# Patient Record
Sex: FEMALE | Race: WHITE | Employment: OTHER | URBAN - METROPOLITAN AREA
[De-identification: names, ages, dates, MRNs, and addresses within clinical notes are randomized per-mention and may not be internally consistent; named-entity substitution may affect disease eponyms.]

---

## 2017-02-22 ENCOUNTER — HOSPITAL ENCOUNTER (OUTPATIENT)
Dept: SURGERY | Age: 62
Discharge: HOME | End: 2017-02-22
Attending: SPECIALIST | Admitting: SPECIALIST

## 2017-02-22 VITALS
HEIGHT: 62 IN | RESPIRATION RATE: 16 BRPM | WEIGHT: 130 LBS | HEART RATE: 73 BPM | DIASTOLIC BLOOD PRESSURE: 83 MMHG | OXYGEN SATURATION: 98 % | SYSTOLIC BLOOD PRESSURE: 118 MMHG | TEMPERATURE: 97.6 F | BODY MASS INDEX: 23.92 KG/M2

## 2017-02-22 RX ORDER — SODIUM CHLORIDE, SODIUM LACTATE, POTASSIUM CHLORIDE, CALCIUM CHLORIDE 600; 310; 30; 20 MG/100ML; MG/100ML; MG/100ML; MG/100ML
INJECTION, SOLUTION INTRAVENOUS CONTINUOUS
Status: DISCONTINUED | OUTPATIENT
Start: 2017-02-22 | End: 2017-02-23 | Stop reason: HOSPADM

## 2017-02-22 RX ADMIN — SODIUM CHLORIDE, SODIUM LACTATE, POTASSIUM CHLORIDE, CALCIUM CHLORIDE: 600; 310; 30; 20 INJECTION, SOLUTION INTRAVENOUS at 10:23

## 2017-02-22 ASSESSMENT — PAIN - FUNCTIONAL ASSESSMENT: PAIN_FUNCTIONAL_ASSESSMENT: 0-10

## 2017-02-22 ASSESSMENT — PAIN SCALES - GENERAL
PAINLEVEL_OUTOF10: 0
PAINLEVEL_OUTOF10: 0

## 2017-02-22 ASSESSMENT — PAIN DESCRIPTION - LOCATION: LOCATION: ABDOMEN

## 2019-11-06 ENCOUNTER — HOSPITAL ENCOUNTER (OUTPATIENT)
Age: 64
Discharge: HOME | End: 2019-11-06
Payer: MEDICARE

## 2019-11-06 ENCOUNTER — OFFICE VISIT (OUTPATIENT)
Dept: FAMILY MEDICINE CLINIC | Age: 64
End: 2019-11-06
Payer: MEDICARE

## 2019-11-06 ENCOUNTER — HOSPITAL ENCOUNTER (OUTPATIENT)
Dept: GENERAL RADIOLOGY | Age: 64
Discharge: HOME | End: 2019-11-06
Payer: MEDICARE

## 2019-11-06 VITALS
SYSTOLIC BLOOD PRESSURE: 138 MMHG | BODY MASS INDEX: 23.04 KG/M2 | TEMPERATURE: 97.8 F | HEART RATE: 78 BPM | DIASTOLIC BLOOD PRESSURE: 88 MMHG | OXYGEN SATURATION: 98 % | HEIGHT: 63 IN | WEIGHT: 130 LBS

## 2019-11-06 DIAGNOSIS — S60.511A ABRASION OF RIGHT HAND, INITIAL ENCOUNTER: ICD-10-CM
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
## 2019-11-06 DIAGNOSIS — T14.8XXA BRUISE: ICD-10-CM

## 2019-11-06 DIAGNOSIS — M79.641 RIGHT HAND PAIN: ICD-10-CM

## 2019-11-06 PROCEDURE — 99202 OFFICE O/P NEW SF 15 MIN: CPT | Performed by: NURSE PRACTITIONER

## 2019-11-06 PROCEDURE — G8484 FLU IMMUNIZE NO ADMIN: HCPCS | Performed by: NURSE PRACTITIONER

## 2019-11-06 PROCEDURE — 1090F PRES/ABSN URINE INCON ASSESS: CPT | Performed by: NURSE PRACTITIONER

## 2019-11-06 PROCEDURE — 1036F TOBACCO NON-USER: CPT | Performed by: NURSE PRACTITIONER

## 2019-11-06 PROCEDURE — G8420 CALC BMI NORM PARAMETERS: HCPCS | Performed by: NURSE PRACTITIONER

## 2019-11-06 PROCEDURE — G8427 DOCREV CUR MEDS BY ELIG CLIN: HCPCS | Performed by: NURSE PRACTITIONER

## 2019-11-06 PROCEDURE — 4040F PNEUMOC VAC/ADMIN/RCVD: CPT | Performed by: NURSE PRACTITIONER

## 2019-11-06 PROCEDURE — 73130 X-RAY EXAM OF HAND: CPT

## 2019-11-06 PROCEDURE — 1123F ACP DISCUSS/DSCN MKR DOCD: CPT | Performed by: NURSE PRACTITIONER

## 2019-11-06 PROCEDURE — G8400 PT W/DXA NO RESULTS DOC: HCPCS | Performed by: NURSE PRACTITIONER

## 2019-11-06 PROCEDURE — 3017F COLORECTAL CA SCREEN DOC REV: CPT | Performed by: NURSE PRACTITIONER

## 2019-11-06 RX ORDER — IBUPROFEN 200 MG
400 TABLET ORAL ONCE
Status: COMPLETED | OUTPATIENT
Start: 2019-11-06 | End: 2019-11-06

## 2019-11-06 RX ADMIN — Medication 400 MG: at 18:47

## 2019-11-06 ASSESSMENT — ENCOUNTER SYMPTOMS
ABDOMINAL PAIN: 0
BOWEL INCONTINENCE: 0
VISUAL CHANGE: 0
VOMITING: 0
DIARRHEA: 0
NAUSEA: 0
RESPIRATORY NEGATIVE: 1

## 2019-11-06 ASSESSMENT — PAIN SCALES - GENERAL: PAINLEVEL_OUTOF10: 8

## 2019-11-07 DIAGNOSIS — S62.346A CLOSED NONDISPLACED FRACTURE OF BASE OF FIFTH METACARPAL BONE OF RIGHT HAND, INITIAL ENCOUNTER: Primary | ICD-10-CM

## 2020-05-06 ENCOUNTER — HOSPITAL ENCOUNTER (OUTPATIENT)
Age: 65
Discharge: HOME | End: 2020-05-06
Payer: MEDICARE

## 2020-05-06 ENCOUNTER — HOSPITAL ENCOUNTER (OUTPATIENT)
Dept: GENERAL RADIOLOGY | Age: 65
Discharge: HOME | End: 2020-05-06
Payer: MEDICARE

## 2020-05-06 PROCEDURE — 71046 X-RAY EXAM CHEST 2 VIEWS: CPT

## 2021-02-08 ENCOUNTER — HOSPITAL ENCOUNTER (OUTPATIENT)
Age: 66
Setting detail: SPECIMEN
Discharge: HOME | End: 2021-02-08
Payer: MEDICARE

## 2021-02-08 PROCEDURE — 87070 CULTURE OTHR SPECIMN AEROBIC: CPT

## 2021-02-12 LAB
CULTURE: NORMAL
Lab: NORMAL
SPECIMEN: NORMAL

## 2022-05-20 ENCOUNTER — HOSPITAL ENCOUNTER (OUTPATIENT)
Age: 67
Setting detail: OBSERVATION
Discharge: HOME | End: 2022-05-21
Attending: STUDENT IN AN ORGANIZED HEALTH CARE EDUCATION/TRAINING PROGRAM | Admitting: FAMILY MEDICINE
Payer: MEDICARE

## 2022-05-20 ENCOUNTER — APPOINTMENT (OUTPATIENT)
Dept: GENERAL RADIOLOGY | Age: 67
End: 2022-05-20
Payer: MEDICARE

## 2022-05-20 DIAGNOSIS — R07.9 CHEST PAIN, UNSPECIFIED TYPE: Primary | ICD-10-CM

## 2022-05-20 LAB
ALBUMIN SERPL-MCNC: 4.4 GM/DL (ref 3.4–5)
ALP BLD-CCNC: 75 IU/L (ref 40–129)
ALT SERPL-CCNC: 14 U/L (ref 10–40)
ANION GAP SERPL CALCULATED.3IONS-SCNC: 11 MMOL/L (ref 4–16)
APTT: 34.2 SECONDS (ref 25.1–37.1)
AST SERPL-CCNC: 19 IU/L (ref 15–37)
BASOPHILS ABSOLUTE: 0.1 K/CU MM
BASOPHILS RELATIVE PERCENT: 0.5 % (ref 0–1)
BILIRUB SERPL-MCNC: 0.9 MG/DL (ref 0–1)
BUN BLDV-MCNC: 10 MG/DL (ref 6–23)
CALCIUM SERPL-MCNC: 10.5 MG/DL (ref 8.3–10.6)
CHLORIDE BLD-SCNC: 100 MMOL/L (ref 99–110)
CO2: 23 MMOL/L (ref 21–32)
CREAT SERPL-MCNC: 0.9 MG/DL (ref 0.6–1.1)
DIFFERENTIAL TYPE: ABNORMAL
EKG ATRIAL RATE: 94 BPM
EKG DIAGNOSIS: NORMAL
EKG P AXIS: 38 DEGREES
EKG P-R INTERVAL: 140 MS
EKG Q-T INTERVAL: 366 MS
EKG QRS DURATION: 92 MS
EKG QTC CALCULATION (BAZETT): 457 MS
EKG R AXIS: 25 DEGREES
EKG T AXIS: 21 DEGREES
EKG VENTRICULAR RATE: 94 BPM
EOSINOPHILS ABSOLUTE: 0 K/CU MM
EOSINOPHILS RELATIVE PERCENT: 0.2 % (ref 0–3)
ERYTHROCYTE SEDIMENTATION RATE: 34 MM/HR (ref 0–30)
GFR AFRICAN AMERICAN: >60 ML/MIN/1.73M2
GFR NON-AFRICAN AMERICAN: >60 ML/MIN/1.73M2
GLUCOSE BLD-MCNC: 131 MG/DL (ref 70–99)
HCT VFR BLD CALC: 44 % (ref 37–47)
HEMOGLOBIN: 14.3 GM/DL (ref 12.5–16)
HIGH SENSITIVE C-REACTIVE PROTEIN: 236.1 MG/L
IMMATURE NEUTROPHIL %: 0.4 % (ref 0–0.43)
INR BLD: 1.15 INDEX
LV EF: 55 %
LVEF MODALITY: NORMAL
LYMPHOCYTES ABSOLUTE: 0.9 K/CU MM
LYMPHOCYTES RELATIVE PERCENT: 9.2 % (ref 24–44)
MCH RBC QN AUTO: 29.5 PG (ref 27–31)
MCHC RBC AUTO-ENTMCNC: 32.5 % (ref 32–36)
MCV RBC AUTO: 90.7 FL (ref 78–100)
MONOCYTES ABSOLUTE: 0.9 K/CU MM
MONOCYTES RELATIVE PERCENT: 9.1 % (ref 0–4)
NUCLEATED RBC %: 0 %
PDW BLD-RTO: 13.2 % (ref 11.7–14.9)
PLATELET # BLD: 219 K/CU MM (ref 140–440)
PMV BLD AUTO: 10 FL (ref 7.5–11.1)
POTASSIUM SERPL-SCNC: 3.8 MMOL/L (ref 3.5–5.1)
PROTHROMBIN TIME: 14.8 SECONDS (ref 11.7–14.5)
RBC # BLD: 4.85 M/CU MM (ref 4.2–5.4)
SEGMENTED NEUTROPHILS ABSOLUTE COUNT: 8.2 K/CU MM
SEGMENTED NEUTROPHILS RELATIVE PERCENT: 80.6 % (ref 36–66)
SODIUM BLD-SCNC: 134 MMOL/L (ref 135–145)
TOTAL IMMATURE NEUTOROPHIL: 0.04 K/CU MM
TOTAL NUCLEATED RBC: 0 K/CU MM
TOTAL PROTEIN: 8 GM/DL (ref 6.4–8.2)
TROPONIN T: <0.01 NG/ML
TSH HIGH SENSITIVITY: 4.25 UIU/ML (ref 0.27–4.2)
WBC # BLD: 10.2 K/CU MM (ref 4–10.5)

## 2022-05-20 PROCEDURE — 84443 ASSAY THYROID STIM HORMONE: CPT

## 2022-05-20 PROCEDURE — 85730 THROMBOPLASTIN TIME PARTIAL: CPT

## 2022-05-20 PROCEDURE — 93005 ELECTROCARDIOGRAM TRACING: CPT | Performed by: STUDENT IN AN ORGANIZED HEALTH CARE EDUCATION/TRAINING PROGRAM

## 2022-05-20 PROCEDURE — 2580000003 HC RX 258: Performed by: INTERNAL MEDICINE

## 2022-05-20 PROCEDURE — 6370000000 HC RX 637 (ALT 250 FOR IP): Performed by: NURSE PRACTITIONER

## 2022-05-20 PROCEDURE — 86141 C-REACTIVE PROTEIN HS: CPT

## 2022-05-20 PROCEDURE — 96372 THER/PROPH/DIAG INJ SC/IM: CPT

## 2022-05-20 PROCEDURE — 80053 COMPREHEN METABOLIC PANEL: CPT

## 2022-05-20 PROCEDURE — C1894 INTRO/SHEATH, NON-LASER: HCPCS

## 2022-05-20 PROCEDURE — 85025 COMPLETE CBC W/AUTO DIFF WBC: CPT

## 2022-05-20 PROCEDURE — 6370000000 HC RX 637 (ALT 250 FOR IP): Performed by: STUDENT IN AN ORGANIZED HEALTH CARE EDUCATION/TRAINING PROGRAM

## 2022-05-20 PROCEDURE — 99285 EMERGENCY DEPT VISIT HI MDM: CPT

## 2022-05-20 PROCEDURE — 2709999900 HC NON-CHARGEABLE SUPPLY

## 2022-05-20 PROCEDURE — 93010 ELECTROCARDIOGRAM REPORT: CPT | Performed by: INTERNAL MEDICINE

## 2022-05-20 PROCEDURE — 6360000002 HC RX W HCPCS: Performed by: NURSE PRACTITIONER

## 2022-05-20 PROCEDURE — 2580000003 HC RX 258: Performed by: NURSE PRACTITIONER

## 2022-05-20 PROCEDURE — G0378 HOSPITAL OBSERVATION PER HR: HCPCS

## 2022-05-20 PROCEDURE — 71045 X-RAY EXAM CHEST 1 VIEW: CPT

## 2022-05-20 PROCEDURE — 6370000000 HC RX 637 (ALT 250 FOR IP): Performed by: INTERNAL MEDICINE

## 2022-05-20 PROCEDURE — 93306 TTE W/DOPPLER COMPLETE: CPT

## 2022-05-20 PROCEDURE — 85652 RBC SED RATE AUTOMATED: CPT

## 2022-05-20 PROCEDURE — 6360000002 HC RX W HCPCS

## 2022-05-20 PROCEDURE — C1769 GUIDE WIRE: HCPCS

## 2022-05-20 PROCEDURE — 36415 COLL VENOUS BLD VENIPUNCTURE: CPT

## 2022-05-20 PROCEDURE — 6360000004 HC RX CONTRAST MEDICATION

## 2022-05-20 PROCEDURE — 93458 L HRT ARTERY/VENTRICLE ANGIO: CPT

## 2022-05-20 PROCEDURE — 99205 OFFICE O/P NEW HI 60 MIN: CPT | Performed by: INTERNAL MEDICINE

## 2022-05-20 PROCEDURE — C1887 CATHETER, GUIDING: HCPCS

## 2022-05-20 PROCEDURE — 2500000003 HC RX 250 WO HCPCS

## 2022-05-20 PROCEDURE — 94761 N-INVAS EAR/PLS OXIMETRY MLT: CPT

## 2022-05-20 PROCEDURE — 93005 ELECTROCARDIOGRAM TRACING: CPT | Performed by: PHYSICIAN ASSISTANT

## 2022-05-20 PROCEDURE — 84484 ASSAY OF TROPONIN QUANT: CPT

## 2022-05-20 PROCEDURE — 85610 PROTHROMBIN TIME: CPT

## 2022-05-20 PROCEDURE — 93458 L HRT ARTERY/VENTRICLE ANGIO: CPT | Performed by: INTERNAL MEDICINE

## 2022-05-20 RX ORDER — COLCHICINE 0.6 MG/1
0.6 TABLET ORAL 2 TIMES DAILY
Status: DISCONTINUED | OUTPATIENT
Start: 2022-05-20 | End: 2022-05-21 | Stop reason: HOSPADM

## 2022-05-20 RX ORDER — SODIUM CHLORIDE 0.9 % (FLUSH) 0.9 %
5-40 SYRINGE (ML) INJECTION PRN
Status: DISCONTINUED | OUTPATIENT
Start: 2022-05-20 | End: 2022-05-21 | Stop reason: HOSPADM

## 2022-05-20 RX ORDER — SODIUM CHLORIDE 0.9 % (FLUSH) 0.9 %
5-40 SYRINGE (ML) INJECTION EVERY 12 HOURS SCHEDULED
Status: DISCONTINUED | OUTPATIENT
Start: 2022-05-20 | End: 2022-05-21 | Stop reason: HOSPADM

## 2022-05-20 RX ORDER — FAMOTIDINE 20 MG/1
20 TABLET, FILM COATED ORAL DAILY
Status: DISCONTINUED | OUTPATIENT
Start: 2022-05-20 | End: 2022-05-21 | Stop reason: HOSPADM

## 2022-05-20 RX ORDER — SODIUM CHLORIDE 9 MG/ML
INJECTION, SOLUTION INTRAVENOUS PRN
Status: DISCONTINUED | OUTPATIENT
Start: 2022-05-20 | End: 2022-05-21 | Stop reason: HOSPADM

## 2022-05-20 RX ORDER — ONDANSETRON 2 MG/ML
4 INJECTION INTRAMUSCULAR; INTRAVENOUS EVERY 6 HOURS PRN
Status: DISCONTINUED | OUTPATIENT
Start: 2022-05-20 | End: 2022-05-21 | Stop reason: HOSPADM

## 2022-05-20 RX ORDER — MORPHINE SULFATE 2 MG/ML
2 INJECTION, SOLUTION INTRAMUSCULAR; INTRAVENOUS EVERY 4 HOURS PRN
Status: DISCONTINUED | OUTPATIENT
Start: 2022-05-20 | End: 2022-05-21 | Stop reason: HOSPADM

## 2022-05-20 RX ORDER — ASPIRIN 81 MG/1
324 TABLET, CHEWABLE ORAL ONCE
Status: COMPLETED | OUTPATIENT
Start: 2022-05-20 | End: 2022-05-20

## 2022-05-20 RX ORDER — ACETAMINOPHEN 325 MG/1
650 TABLET ORAL EVERY 6 HOURS PRN
Status: DISCONTINUED | OUTPATIENT
Start: 2022-05-20 | End: 2022-05-21 | Stop reason: HOSPADM

## 2022-05-20 RX ORDER — 0.9 % SODIUM CHLORIDE 0.9 %
500 INTRAVENOUS SOLUTION INTRAVENOUS ONCE
Status: DISCONTINUED | OUTPATIENT
Start: 2022-05-20 | End: 2022-05-21 | Stop reason: HOSPADM

## 2022-05-20 RX ORDER — ASPIRIN 81 MG/1
81 TABLET ORAL
Status: DISCONTINUED | OUTPATIENT
Start: 2022-05-21 | End: 2022-05-21 | Stop reason: HOSPADM

## 2022-05-20 RX ORDER — ATORVASTATIN CALCIUM 40 MG/1
40 TABLET, FILM COATED ORAL NIGHTLY
Status: DISCONTINUED | OUTPATIENT
Start: 2022-05-20 | End: 2022-05-21 | Stop reason: HOSPADM

## 2022-05-20 RX ORDER — ENOXAPARIN SODIUM 100 MG/ML
40 INJECTION SUBCUTANEOUS EVERY 24 HOURS
Status: DISCONTINUED | OUTPATIENT
Start: 2022-05-20 | End: 2022-05-21 | Stop reason: HOSPADM

## 2022-05-20 RX ORDER — ACETAMINOPHEN 325 MG/1
650 TABLET ORAL EVERY 4 HOURS PRN
Status: DISCONTINUED | OUTPATIENT
Start: 2022-05-20 | End: 2022-05-20

## 2022-05-20 RX ORDER — ACETAMINOPHEN 650 MG/1
650 SUPPOSITORY RECTAL EVERY 6 HOURS PRN
Status: DISCONTINUED | OUTPATIENT
Start: 2022-05-20 | End: 2022-05-21 | Stop reason: HOSPADM

## 2022-05-20 RX ORDER — POLYETHYLENE GLYCOL 3350 17 G/17G
17 POWDER, FOR SOLUTION ORAL DAILY PRN
Status: DISCONTINUED | OUTPATIENT
Start: 2022-05-20 | End: 2022-05-21 | Stop reason: HOSPADM

## 2022-05-20 RX ORDER — KETOROLAC TROMETHAMINE 30 MG/ML
15 INJECTION, SOLUTION INTRAMUSCULAR; INTRAVENOUS EVERY 6 HOURS PRN
Status: DISCONTINUED | OUTPATIENT
Start: 2022-05-20 | End: 2022-05-20

## 2022-05-20 RX ORDER — ONDANSETRON 4 MG/1
4 TABLET, ORALLY DISINTEGRATING ORAL EVERY 8 HOURS PRN
Status: DISCONTINUED | OUTPATIENT
Start: 2022-05-20 | End: 2022-05-21 | Stop reason: HOSPADM

## 2022-05-20 RX ORDER — HYDRALAZINE HYDROCHLORIDE 20 MG/ML
10 INJECTION INTRAMUSCULAR; INTRAVENOUS EVERY 10 MIN PRN
Status: DISCONTINUED | OUTPATIENT
Start: 2022-05-20 | End: 2022-05-21 | Stop reason: HOSPADM

## 2022-05-20 RX ORDER — LANOLIN ALCOHOL/MO/W.PET/CERES
3 CREAM (GRAM) TOPICAL NIGHTLY PRN
Status: DISCONTINUED | OUTPATIENT
Start: 2022-05-20 | End: 2022-05-21 | Stop reason: HOSPADM

## 2022-05-20 RX ADMIN — Medication 1 TABLET: at 13:01

## 2022-05-20 RX ADMIN — MELATONIN 3 MG: at 23:45

## 2022-05-20 RX ADMIN — ENOXAPARIN SODIUM 40 MG: 100 INJECTION SUBCUTANEOUS at 13:01

## 2022-05-20 RX ADMIN — ASPIRIN 324 MG: 81 TABLET, CHEWABLE ORAL at 10:56

## 2022-05-20 RX ADMIN — SODIUM CHLORIDE, PRESERVATIVE FREE 10 ML: 5 INJECTION INTRAVENOUS at 21:38

## 2022-05-20 RX ADMIN — COLCHICINE 0.6 MG: 0.6 TABLET ORAL at 21:38

## 2022-05-20 RX ADMIN — FAMOTIDINE 20 MG: 20 TABLET ORAL at 13:01

## 2022-05-20 ASSESSMENT — PAIN SCALES - GENERAL
PAINLEVEL_OUTOF10: 3
PAINLEVEL_OUTOF10: 6
PAINLEVEL_OUTOF10: 6
PAINLEVEL_OUTOF10: 0

## 2022-05-20 ASSESSMENT — PAIN DESCRIPTION - ONSET: ONSET: GRADUAL

## 2022-05-20 ASSESSMENT — PAIN DESCRIPTION - LOCATION
LOCATION: BACK;CHEST;OTHER (COMMENT)
LOCATION: CHEST
LOCATION: CHEST;BACK

## 2022-05-20 ASSESSMENT — PAIN DESCRIPTION - DESCRIPTORS
DESCRIPTORS: ACHING
DESCRIPTORS: SHARP

## 2022-05-20 ASSESSMENT — PAIN - FUNCTIONAL ASSESSMENT: PAIN_FUNCTIONAL_ASSESSMENT: 0-10

## 2022-05-20 ASSESSMENT — PAIN DESCRIPTION - ORIENTATION: ORIENTATION: UPPER

## 2022-05-20 ASSESSMENT — PAIN DESCRIPTION - PAIN TYPE
TYPE: ACUTE PAIN
TYPE: ACUTE PAIN

## 2022-05-20 ASSESSMENT — PAIN DESCRIPTION - FREQUENCY: FREQUENCY: INTERMITTENT

## 2022-05-20 NOTE — H&P
History and Physical      Name:  Minh Xiong /Age/Sex: 1955  (69 y.o. female)   MRN & CSN:  [id number] & [id number] Admission Date/Time: 2022 10:36 AM   Location:  [facility]/3126 PCP: Ada Torres MD       Hospital Day: 1           Assessment and Plan:   # Chest pain - concern for STEMI s/p LHC showing no occlusive disease, reportedly findings consistent with possible pericarditis; report pending. Management per Cardiology. # Possible pericarditis - ESR elevated, CRP pending. NSAIDS, morphine prn for breakthrough, f/u TTE  # Hyponatremia - Mild, sodium 134, oral hydration  # Hyperglycemia - likely stress induced, A1C in am    Present on Admission:   Chest pain             Diet ADULT DIET; Regular; Low Fat/Low Chol/High Fiber/2 gm Na; No Caffeine   DVT Prophylaxis [x] Lovenox, []  Heparin, [] SCDs, [] Ambulation  [] Long term AC   GI Prophylaxis [] PPI,  [x] H2 Blocker,  [] Carafate,  [] Diet/Tube Feeds   Code Status Full code   Disposition Admit to observation. Patient plans to return home upon discharge         Chief Complaint: Chest Pain      History obtained from patient, EHR   History of Present Illness:   Minh Xiong is a 69 y.o. female  with no significant medical history who presents with chest pain. The patient reports waking up this morning with bilateral mid chest wall pain which worsened with taking a deep breath radiating to her back. She denies associated shortness of breath nausea vomiting or diaphoresis. She describes chest pain as constant pressure. She denies any prior history. She does note she had chills yesterday but did not measure her temperature. She denies new medications or injury. She presented to ED for evaluation. Chest x-ray was nonacute. EKG showed diffuse ST elevation with T wave abnormality concerning for acute STEMI. Troponin negative x1. Chemistry showed sodium of 134 glucose 131 otherwise unremarkable. LFTs unremarkable.   CBC shows WBC of      Spouse name: None    Number of children: None    Years of education: None    Highest education level: None   Occupational History    None   Tobacco Use    Smoking status: Never Smoker    Smokeless tobacco: Never Used   Vaping Use    Vaping Use: Never used   Substance and Sexual Activity    Alcohol use: Yes     Alcohol/week: 3.0 standard drinks     Types: 3 Glasses of wine per week     Comment: occasional    Drug use: No    Sexual activity: None   Other Topics Concern    None   Social History Narrative    None     Social Determinants of Health     Financial Resource Strain:     Difficulty of Paying Living Expenses: Not on file   Food Insecurity:     Worried About Running Out of Food in the Last Year: Not on file    Bunny of Food in the Last Year: Not on file   Transportation Needs:     Lack of Transportation (Medical): Not on file    Lack of Transportation (Non-Medical): Not on file   Physical Activity:     Days of Exercise per Week: Not on file    Minutes of Exercise per Session: Not on file   Stress:     Feeling of Stress : Not on file   Social Connections:     Frequency of Communication with Friends and Family: Not on file    Frequency of Social Gatherings with Friends and Family: Not on file    Attends Denominational Services: Not on file    Active Member of [address] [place] [address] or Organizations: Not on file    Attends Club or Organization Meetings: Not on file    Marital Status: Not on file   Intimate Partner Violence:     Fear of Current or Ex-Partner: Not on file    Emotionally Abused: Not on file    Physically Abused: Not on file    Sexually Abused: Not on file   Housing Stability:     Unable to Pay for Housing in the Last Year: Not on file    Number of [place] in the Last Year: Not on file    Unstable Housing in the Last Year: Not on file      reports that she has never smoked.  She has never used smokeless tobacco.   reports current alcohol use of about 3.0 standard drinks of alcohol per week. reports no history of drug use. Social history reviewed  Allergies:   No Known Allergies    Home Medications:     Prior to Admission medications    Medication Sig Start Date End Date Taking?  Authorizing Provider   Calcium Carb-Cholecalciferol (CALCIUM 600 + D PO) Take by mouth    Historical Provider, MD   Multiple Vitamins-Minerals (THERAPEUTIC MULTIVITAMIN-MINERALS) tablet Take 1 tablet by mouth daily    Historical Provider, MD   Cholecalciferol (VITAMIN D3) 2000 UNITS CAPS Take by mouth daily    Historical Provider, MD   Misc Natural Products (OSTEO BI-FLEX ADV TRIPLE ST PO) Take by mouth daily    Historical Provider, Tae Flynn, (Shazia Flank) POWD by Does not apply route    Historical Provider, MD   Omega-3 Fatty Acids (OMEGA 3 500 PO) Take by mouth    Historical Provider, MD   Garlic 791 MG TABS Take by mouth    Historical Provider, MD   Coenzyme Q10 (CO Q-10) 100 MG CAPS Take by mouth daily    Historical Provider, MD   Nutritional Supplements (JUICE PLUS FIBRE PO) Take by mouth daily    Historical Provider, MD   Probiotic Product (PROBIOTIC & ACIDOPHILUS EX ST PO) Take by mouth    Historical Provider, MD   aspirin 81 MG tablet Take 81 mg by mouth four times a week    Historical Provider, MD         Medications:   Medications:    sodium chloride flush  5-40 mL IntraVENous 2 times per day    atorvastatin  40 mg Oral Nightly    famotidine  20 mg Oral Daily    enoxaparin  40 mg SubCUTAneous Q24H    [START ON 5/21/2022] aspirin  81 mg Oral Once per day on Sun Tue Thu Sat    calcium-cholecalciferol  1 tablet Oral Daily      Infusions:    sodium chloride       PRN Meds: sodium chloride flush, 5-40 mL, PRN  sodium chloride, , PRN  ondansetron, 4 mg, Q8H PRN   Or  ondansetron, 4 mg, Q6H PRN  acetaminophen, 650 mg, Q6H PRN   Or  acetaminophen, 650 mg, Q6H PRN  polyethylene glycol, 17 g, Daily PRN        Data:     Laboratory this visit:  Reviewed  Recent Labs     05/20/22  1048 WBC 10.2   HGB 14.3   HCT 44.0         Recent Labs     05/20/22  1045   *   K 3.8      CO2 23   BUN 10   CREATININE 0.9     Recent Labs     05/20/22  1045   AST 19   ALT 14   BILITOT 0.9   ALKPHOS 75     Recent Labs     05/20/22  1045   INR 1.15         Radiology this visit:  Reviewed. XR CHEST PORTABLE    Result Date: 5/20/2022  EXAMINATION: ONE XRAY VIEW OF THE CHEST 5/20/2022 10:40 am COMPARISON: May 6, 2020 HISTORY: ORDERING SYSTEM PROVIDED HISTORY: Chest pain TECHNOLOGIST PROVIDED HISTORY: Reason for exam:->Chest pain Reason for Exam: Chest pain FINDINGS: The cardiomediastinal silhouette is not enlarged. No pleural effusion or pneumothorax. No focal consolidation. Calcified granuloma over the left lower chest.  Streak atelectasis in the left lung base. No acute cardiopulmonary abnormality.            EKG this visit:  personally reviewed         Electronically signed by KIERA Melendez CNP on 5/20/2022 at 12:56 PM

## 2022-05-20 NOTE — CONSULTS
CARDIOLOGY CONSULT NOTE   Reason for consultation:  STEMI    Referring physician:  Kathy Michelle MD     Primary care physician: Nadeem Jackson MD      Dear  Dr. Kathy Michelle MD   Thanks for the consult. Chief Complaints :  Chief Complaint   Patient presents with    Chest Pain        History of present illness:Susan is a 69 y. o.year old who presents with going pain pressure-like sensation which gets worse with deep inspiration and laying down flat also pain going to her back between shoulder blades she has been short of breath has been coughing and some respiratory symptoms hence came into the emergency department where there was concern for possible ST elevation in aVL and STEMI was activated. Past medical history:    has no past medical history on file. Past surgical history:   has a past surgical history that includes Breast enhancement surgery; Colonoscopy (2005); and Colonoscopy (02/22/2017). Social History:   reports that she has never smoked. She has never used smokeless tobacco. She reports current alcohol use of about 3.0 standard drinks of alcohol per week. She reports that she does not use drugs.   Family history:   no family history of CAD, STROKE of DM at early age    No Known Allergies    sodium chloride flush 0.9 % injection 5-40 mL, 2 times per day  sodium chloride flush 0.9 % injection 5-40 mL, PRN  0.9 % sodium chloride infusion, PRN  ondansetron (ZOFRAN-ODT) disintegrating tablet 4 mg, Q8H PRN   Or  ondansetron (ZOFRAN) injection 4 mg, Q6H PRN  acetaminophen (TYLENOL) tablet 650 mg, Q6H PRN   Or  acetaminophen (TYLENOL) suppository 650 mg, Q6H PRN  polyethylene glycol (GLYCOLAX) packet 17 g, Daily PRN  atorvastatin (LIPITOR) tablet 40 mg, Nightly  famotidine (PEPCID) tablet 20 mg, Daily  enoxaparin (LOVENOX) injection 40 mg, Q24H  [START ON 5/21/2022] aspirin EC tablet 81 mg, Once per day on Sun Tue Thu Sat  calcium-cholecalciferol 500-200 MG-UNIT per tablet 1 tablet, Daily  [Held by provider] ketorolac (TORADOL) injection 15 mg, Q6H PRN      Current Facility-Administered Medications   Medication Dose Route Frequency Provider Last Rate Last Admin    sodium chloride flush 0.9 % injection 5-40 mL  5-40 mL IntraVENous 2 times per day KIERA Triplett CNP        sodium chloride flush 0.9 % injection 5-40 mL  5-40 mL IntraVENous PRN KIERA Triplett CNP        0.9 % sodium chloride infusion   IntraVENous PRN KIERA Triplett CNP        ondansetron (ZOFRAN-ODT) disintegrating tablet 4 mg  4 mg Oral Q8H PRN KIERA Triplett CNP        Or    ondansetron (ZOFRAN) injection 4 mg  4 mg IntraVENous Q6H PRN KIERA Triplett CNP        acetaminophen (TYLENOL) tablet 650 mg  650 mg Oral Q6H PRN KIERA Triplett CNP        Or    acetaminophen (TYLENOL) suppository 650 mg  650 mg Rectal Q6H PRN KIERA Triplett CNP        polyethylene glycol (GLYCOLAX) packet 17 g  17 g Oral Daily PRN KIERA Triplett CNP        atorvastatin (LIPITOR) tablet 40 mg  40 mg Oral Nightly KIERA Triplett CNP        famotidine (PEPCID) tablet 20 mg  20 mg Oral Daily KIERA Triplett CNP   20 mg at 05/20/22 1301    enoxaparin (LOVENOX) injection 40 mg  40 mg SubCUTAneous Q24H KIERA Triplett CNP   40 mg at 05/20/22 1301    [START ON 5/21/2022] aspirin EC tablet 81 mg  81 mg Oral Once per day on Sun Tue Thu Sat KIERA Triplett CNP        calcium-cholecalciferol 500-200 MG-UNIT per tablet 1 tablet  1 tablet Oral Daily KIERA Triplett CNP   1 tablet at 05/20/22 1301    [Held by provider] ketorolac (TORADOL) injection 15 mg  15 mg IntraVENous Q6H PRN KIERA Yao CNP         Review of Systems:   · Constitutional: No Fever or Weight Loss   · Eyes: No Decreased Vision  · ENT: No Headaches, Hearing Loss or Vertigo  · Cardiovascular: As per HPI  · Respiratory: As per HPI  · Gastrointestinal: No abdominal pain, appetite loss, blood in stools, constipation, diarrhea or heartburn  · Genitourinary: No dysuria, trouble voiding, or hematuria  · Musculoskeletal:  No gait disturbance, weakness or joint complaints  · Integumentary: No rash or pruritis  · Neurological: No TIA or stroke symptoms  · Psychiatric: No anxiety or depression  · Endocrine: No malaise, fatigue or temperature intolerance  · Hematologic/Lymphatic: No bleeding problems, blood clots or swollen lymph nodes  · Allergic/Immunologic: No nasal congestion or hives  All systems negative except as marked. Physical Examination:    Vitals:    05/20/22 1300 05/20/22 1330 05/20/22 1345 05/20/22 1400   BP: 110/75 110/79 106/75 110/73   Pulse: 85 83 83 83   Resp: 22 25 22 15   Temp:       TempSrc:       SpO2: 94% 95% 95% 94%   Weight:       Height:           General Appearance:  No distress, conversant    Constitutional:  Well developed, Well nourished, No acute distress, Non-toxic appearance. HENT:  Normocephalic, Atraumatic, Bilateral external ears normal, Oropharynx moist, No oral exudates, Nose normal. Neck- Normal range of motion, No tenderness, Supple, No stridor,no apical-carotid delay  Lymphatics : no palpable lymph nodes  Eyes:  PERRL, EOMI, Conjunctiva normal, No discharge. Respiratory:  Normal breath sounds, No respiratory distress, No wheezing, No chest tenderness. ,no use of accessory muscles, crackles Absent   Cardiovascular: (PMI) apex non displaced,no lifts no thrills, ankle swelling Absent  , 1+, s1 and s2 audible,Murmur. Absent , JVD not noted    Abdomen /GI:  Bowel sounds normal, Soft, No tenderness, No masses, No gross visceromegaly   :  No costovertebral angle tenderness   Musculoskeletal:  No edema, no tenderness, no deformities.  Back- no tenderness  Integument:  Well hydrated, no rash   Lymphatic:  No lymphadenopathy noted   Neurologic:  Alert & oriented x 3, CN 2-12 normal, normal motor function, normal sensory function, no focal deficits noted           Medical decision making and Data review:    Lab Review   Recent Labs     05/20/22  1045   WBC 10.2   HGB 14.3   HCT 44.0         Recent Labs     05/20/22  1045   *   K 3.8      CO2 23   BUN 10   CREATININE 0.9     Recent Labs     05/20/22  1045   AST 19   ALT 14   BILITOT 0.9   ALKPHOS 75     Recent Labs     05/20/22  1045   TROPONINT <0.010       No results for input(s): PROBNP in the last 72 hours. Lab Results   Component Value Date    INR 1.15 05/20/2022    PROTIME 14.8 (H) 05/20/2022       EKG: (reviewed by myself)    ECHO:(reviewed by myself)    Chest Xray:(reviewed by myself)  XR CHEST PORTABLE    Result Date: 5/20/2022  EXAMINATION: ONE XRAY VIEW OF THE CHEST 5/20/2022 10:40 am COMPARISON: May 6, 2020 HISTORY: ORDERING SYSTEM PROVIDED HISTORY: Chest pain TECHNOLOGIST PROVIDED HISTORY: Reason for exam:->Chest pain Reason for Exam: Chest pain FINDINGS: The cardiomediastinal silhouette is not enlarged. No pleural effusion or pneumothorax. No focal consolidation. Calcified granuloma over the left lower chest.  Streak atelectasis in the left lung base. No acute cardiopulmonary abnormality. All labs, medications and tests reviewed by myself including data  from outside source , patient and available family . Continue all other medications of all above medical condition listed as is. Impression:  Principal Problem:    Chest pain  Resolved Problems:    * No resolved hospital problems. *      Assessment: 69 y. o.year old with PMH of  has no past medical history on file. Plan and Recommendations:    Suspected pericarditis or pleurisy with ongoing cough and symptoms getting worse on deep inspiration EKG shows ST elevation only in aVL but given her symptoms and ongoing pain we will proceed with cardiac catheterization to define coronary anatomy / STEMI activation  Check ESR CRP  Get echo  DVT prophylaxis if no contraindication  6.    Dyslipidemia: Lipid panel          Thank you  much for consult and giving us the opportunity in contributing in the care of this patient. Please feel free to call me for any questions.        Denis Fuentes MD, 5/20/2022 3:35 PM

## 2022-05-20 NOTE — ED TRIAGE NOTES
Pt states chest pain yesterday continuing to today, radiates to shoulder
POST-OP DIAGNOSIS:  Bladder stone 05-May-2021 17:21:22  Mohit Moya R

## 2022-05-20 NOTE — ED PROVIDER NOTES
Emergency Department Encounter    Patient: John Chung  MRN: [id number]  : 1955  Date of Evaluation: 2022  ED Provider:  Humberto Chang MD    Triage Chief Complaint:   Chest Pain    [place]:  John Chung is a 69 y.o. female senting with chest discomfort since yesterday. Patient states she woke up yesterday with diffuse chest pressure. States has been constant with worsening with deep breaths. States the pain is 6 out of 10, constant with intermittent worsening. States she has trouble describing the pain. Denies radiation of pain. States she did have some chills yesterday. Denies fevers. Denies recent falls or trauma. Denies headache, blurred vision, focal neurodeficits, motor or sensory changes, lightheadedness or dizziness, central pain along the spine. Denies abdominal pain, nausea vomiting, diarrhea constipation, urinary symptoms. Denies previous cardiopulmonary history. Denies history of MI or stroke in the past.  Denies history of hypertension, hyperlipidemia, diabetes. States she does not smoke. Denies significant alcohol use or any drug use. ROS - see HPI, below listed is current ROS at time of my eval:  At least 14 systems reviewed, negative other HPI    History reviewed. No pertinent past medical history. Past Surgical History:   Procedure Laterality Date    BREAST ENHANCEMENT SURGERY      -original implants;     -rupture then replaced 1996-ruptured    COLONOSCOPY      COLONOSCOPY  2017    mild diverticulosis coli, internal grade 1 hemorrhoids     History reviewed. No pertinent family history.   Social History     Socioeconomic History    Marital status:      Spouse name: Not on file    Number of children: Not on file    Years of education: Not on file    Highest education level: Not on file   Occupational History    Not on file   Tobacco Use    Smoking status: Never Smoker    Smokeless tobacco: Never Used   Vaping Use    Vaping Use: Never used   Substance and Sexual Activity    Alcohol use: Yes     Alcohol/week: 3.0 standard drinks     Types: 3 Glasses of wine per week     Comment: occasional    Drug use: No    Sexual activity: Not on file   Other Topics Concern    Not on file   Social History Narrative    Not on file     Social Determinants of Health     Financial Resource Strain:     Difficulty of Paying Living Expenses: Not on file   Food Insecurity:     Worried About Running Out of Food in the Last Year: Not on file    Bunny of Food in the Last Year: Not on file   Transportation Needs:     Lack of Transportation (Medical): Not on file    Lack of Transportation (Non-Medical):  Not on file   Physical Activity:     Days of Exercise per Week: Not on file    Minutes of Exercise per Session: Not on file   Stress:     Feeling of Stress : Not on file   Social Connections:     Frequency of Communication with Friends and Family: Not on file    Frequency of Social Gatherings with Friends and Family: Not on file    Attends [employer] Services: Not on file    Active Member of [address] [place] [address] or Organizations: Not on file    Attends Club or Organization Meetings: Not on file    Marital Status: Not on file   Intimate Partner Violence:     Fear of Current or Ex-Partner: Not on file    Emotionally Abused: Not on file    Physically Abused: Not on file    Sexually Abused: Not on file   Housing Stability:     Unable to Pay for Housing in the Last Year: Not on file    Number of [place] in the Last Year: Not on file    Unstable Housing in the Last Year: Not on file     Current Facility-Administered Medications   Medication Dose Route Frequency Provider Last Rate Last Admin    sodium chloride flush 0.9 % injection 5-40 mL  5-40 mL IntraVENous 2 times per day KIERA Fritz CNP        sodium chloride flush 0.9 % injection 5-40 mL  5-40 mL IntraVENous PRN KIERA Fritz CNP        0.9 % sodium chloride infusion   IntraVENous PRN Marlena Severs, APRN - LUIS ANTONIO        ondansetron (ZOFRAN-ODT) disintegrating tablet 4 mg  4 mg Oral Q8H PRN Marlena Severs, APRN - CNP        Or    ondansetron (ZOFRAN) injection 4 mg  4 mg IntraVENous Q6H PRN Marlena Severs, APRN - CNP        acetaminophen (TYLENOL) tablet 650 mg  650 mg Oral Q6H PRN Marlena Severs, APRN - CNP        Or    acetaminophen (TYLENOL) suppository 650 mg  650 mg Rectal Q6H PRN Marlena Severs, APRN - CNP        polyethylene glycol (GLYCOLAX) packet 17 g  17 g Oral Daily PRN Marlena Severs, APRN - CNP        atorvastatin (LIPITOR) tablet 40 mg  40 mg Oral Nightly Marlena Severs, APRN - CNP        famotidine (PEPCID) tablet 20 mg  20 mg Oral Daily Marlena Severs, APRN - CNP   20 mg at 05/20/22 1301    enoxaparin (LOVENOX) injection 40 mg  40 mg SubCUTAneous Q24H Marlena Severs, APRN - CNP   40 mg at 05/20/22 1301    [START ON 5/21/2022] aspirin EC tablet 81 mg  81 mg Oral Once per day on Sun Tue Thu Sat Marlena Severs, APRN - CNP        calcium-cholecalciferol 500-200 MG-UNIT per tablet 1 tablet  1 tablet Oral Daily Marlena Severs, APRN - CNP   1 tablet at 05/20/22 1301    [Held by provider] ketorolac (TORADOL) injection 15 mg  15 mg IntraVENous Q6H PRN Marlena Severs, APRN - CNP        colchicine (COLCRYS) tablet 0.6 mg  0.6 mg Oral BID Elizabeth Hunter MD        0.9 % sodium chloride bolus  500 mL IntraVENous Once Elizabeth Hunter MD        sodium chloride flush 0.9 % injection 5-40 mL  5-40 mL IntraVENous 2 times per day Elizabeth Hunter MD        sodium chloride flush 0.9 % injection 5-40 mL  5-40 mL IntraVENous PRN Elizabeth Hunter MD        0.9 % sodium chloride infusion   IntraVENous PRN Elizbaeth Hunter MD        acetaminophen (TYLENOL) tablet 650 mg  650 mg Oral Q4H PRN Elizabeth Hunter MD        hydrALAZINE (APRESOLINE) injection 10 mg  10 mg IntraVENous Q10 Min PRN Elizabeth Hunter MD         No Known Allergies    Nursing Notes Reviewed    Physical Exam:  Triage VS:    ED Triage Vitals [05/20/22 1030]   Enc Vitals Group      BP (!) 140/83      Pulse 101      Resp 18      Temp 97.8 °F (36.6 °C)      Temp Source Oral      SpO2 98 %      Weight 130 lb (59 kg)      Height 5' 2\" (1.575 m)      Head Circumference       Peak Flow       Pain Score       Pain Loc       Pain Edu? Excl. in [address]? My pulse ox interpretation is  normal    General appearance:  No acute distress. Skin:  Warm. Dry. Eye:  Extraocular movements intact. Ears, nose, mouth and throat:  Oral mucosa moist   Neck:  Trachea midline. Extremity:  No swelling. Normal ROM     Heart:  Regular rate and rhythm, normal S1 & S2, no extra heart sounds. Perfusion:  intact  Respiratory:  Lungs clear to auscultation bilaterally. Respirations nonlabored. Abdominal:  Normal bowel sounds. Soft. Nontender. Non distended. Back:  No CVA tenderness to palpation     Neurological:  Alert and oriented times 3. No focal neuro deficits.              Psychiatric:  Appropriate    I have reviewed and interpreted all of the currently available lab results from this visit (if applicable):  Results for orders placed or performed during the hospital encounter of 05/20/22   CBC with Auto Differential   Result Value Ref Range    WBC 10.2 4.0 - 10.5 K/CU MM    RBC 4.85 4.2 - 5.4 M/CU MM    Hemoglobin 14.3 12.5 - 16.0 GM/DL    Hematocrit 44.0 37 - 47 %    MCV 90.7 78 - 100 FL    MCH 29.5 27 - 31 PG    MCHC 32.5 32.0 - 36.0 %    RDW 13.2 11.7 - 14.9 %    Platelets 224 944 - 838 K/CU MM    MPV 10.0 7.5 - 11.1 FL    Differential Type AUTOMATED DIFFERENTIAL     Segs Relative 80.6 (H) 36 - 66 %    Lymphocytes % 9.2 (L) 24 - 44 %    Monocytes % 9.1 (H) 0 - 4 %    Eosinophils % 0.2 0 - 3 %    Basophils % 0.5 0 - 1 %    Segs Absolute 8.2 K/CU MM    Lymphocytes Absolute 0.9 K/CU MM    Monocytes Absolute 0.9 K/CU MM    Eosinophils Absolute 0.0 K/CU MM    Basophils Absolute 0.1 K/CU MM    Nucleated RBC % 0.0 %    Total Nucleated RBC 0.0 K/CU MM    Total Immature Neutrophil 0.04 K/CU MM    Immature Neutrophil % 0.4 0 - 0.43 %   Comprehensive Metabolic Panel   Result Value Ref Range    Sodium 134 (L) 135 - 145 MMOL/L    Potassium 3.8 3.5 - 5.1 MMOL/L    Chloride 100 99 - 110 mMol/L    CO2 23 21 - 32 MMOL/L    BUN 10 6 - 23 MG/DL    CREATININE 0.9 0.6 - 1.1 MG/DL    Glucose 131 (H) 70 - 99 MG/DL    Calcium 10.5 8.3 - 10.6 MG/DL    Albumin 4.4 3.4 - 5.0 GM/DL    Total Protein 8.0 6.4 - 8.2 GM/DL    Total Bilirubin 0.9 0.0 - 1.0 MG/DL    ALT 14 10 - 40 U/L    AST 19 15 - 37 IU/L    Alkaline Phosphatase 75 40 - 129 IU/L    GFR Non-African American >60 >60 mL/min/1.73m2    GFR African American >60 >60 mL/min/1.73m2    Anion Gap 11 4 - 16   Troponin   Result Value Ref Range    Troponin T <0.010 <0.01 NG/ML   Protime-INR   Result Value Ref Range    Protime 14.8 (H) 11.7 - 14.5 SECONDS    INR 1.15 INDEX   PTT   Result Value Ref Range    aPTT 34.2 25.1 - 37.1 SECONDS   C-Reactive Protein   Result Value Ref Range    CRP, High Sensitivity 236.1 mg/L   Sedimentation Rate   Result Value Ref Range    Sed Rate 34 (H) 0 - 30 MM/HR   TSH   Result Value Ref Range    TSH, High Sensitivity 4.250 (H) 0.270 - 4.20 uIu/ml   EKG 12 Lead   Result Value Ref Range    Ventricular Rate 94 BPM    Atrial Rate 94 BPM    P-R Interval 140 ms    QRS Duration 92 ms    Q-T Interval 366 ms    QTc Calculation (Bazett) 457 ms    P Axis 38 degrees    R Axis 25 degrees    T Axis 21 degrees    Diagnosis       Normal sinus rhythm  Possible Left atrial enlargement  RSR' or QR pattern in V1 suggests right ventricular conduction delay  ST elevation, consider inferolateral injury or acute infarct  ** ** ACUTE MI / STEMI ** **  Abnormal ECG  When compared with ECG of 20-MAY-2022 10:33,  No significant change was found        Radiographs (if obtained):  Radiologist's Report Reviewed:  ECHO Complete 2D W Doppler W Color    Result Date: 5/20/2022  Transthoracic Echocardiography Report (TTE)  Demographics   Patient Name       Adriana Multani     Date of Study       05/20/2022   Date of Birth      1955         Gender              Female   Age                69 year(s)         Race                   Patient Number     [id number]         Room Number         3126   Visit Number       [id number]   Corporate ID       [id number]   Accession Number   [id number]         23 Tori Chung A[place]ANDREA   Ordering Physician Sonu Maciel MD                 Physician           MD  Procedure Type of Study   TTE procedure:ECHOCARDIOGRAM COMPLETE 2D W DOPPLER W COLOR. Procedure Date Date: 05/20/2022 Start: 03:51 PM Study Location: Portable Technical Quality: Adequate visualization Indications:Chest pain. Patient Status: Routine Height: 62 inches Weight: 130 pounds BSA: 1.59 m2 BMI: 23.78 kg/m2 HR: 87 bpm BP: 120/68 mmHg  Conclusions   Summary  Left ventricular systolic function is normal.  Ejection fraction is visually estimated at 55%. No significant valvular disease noted. No evidence of any pericardial effusion. Signature   ------------------------------------------------------------------  Electronically signed by José Antonio Tsai MD (Interpreting  physician) on 05/20/2022 at 05:23 PM  ------------------------------------------------------------------   Findings   Left Ventricle  Left ventricular systolic function is normal.  Ejection fraction is visually estimated at 55%. No regional wall motion abnormalites. Left ventricle size is normal.  Normal diastolic function. Left Atrium  Essentially normal left atrium. Right Atrium  Essentially normal right atrium. Right Ventricle  Essentially normal right ventricle. Aortic Valve  Structurally normal aortic valve.    Mitral Valve Structurally normal mitral valve. Tricuspid Valve  Tricuspid valve is structurally normal.   Pulmonic Valve  The pulmonic valve was not well visualized. Pericardial Effusion  No evidence of any pericardial effusion. Pleural Effusion  No evidence of pleural effusion.   M-Mode/2D Measurements & Calculations   LV Diastolic Dimension:  LV Systolic Dimension:  LA Dimension: 2.6 cmAO Root  3.91 cm                  1.66 cm                 Dimension: 2.7 cmLA Area:  LV FS:57.5 %             LV Volume Diastolic: 58 09.8 cm2  LV PW Diastolic: 7.78 cm ml  LV PW Systolic: 1.27 cm  LV Volume Systolic: 19  Septum Diastolic: 0.23   ml  cm                       LV EDV/LV EDV Index: 58 RV Diastolic Dimension:  Septum Systolic: 2.93 cm CT/57 G1ZN ESV/LV ESV   1.83 cm  CO: 6.69 l/min           Index: 19 ml/12 m2  CI: 4.21 l/m*m2          EF Calculated (A4C):    LA/Aorta: 0.96                           67.2 %  LV Area Diastolic: 23    EF Calculated (2D):     LA volume/Index: 40 ml  cm2                      88.1 %                  /57Q6  LV Area Systolic: 11 cm2                           LV Length: 7.63 cm                            LVOT: 2.1 cm  Doppler Measurements & Calculations   MV Peak E-Wave: 65.6    AV Peak Velocity: 134 cm/s   LVOT Peak Velocity: 102  cm/s                    AV Peak Gradient: 7.18 mmHg  cm/s  MV Peak A-Wave: 47.4    AV Mean Velocity: 99.5 cm/s  LVOT Mean Velocity: 77  cm/s                    AV Mean Gradient: 4 mmHg     cm/s  MV E/A Ratio: 1.38      AV VTI: 30.1 cm              LVOT Peak Gradient: 4  MV Peak Gradient: 1.72  AV Area (Continuity):2.55    mmHgLVOT Mean Gradient:  mmHg                    cm2                          3 mmHg   MV P1/2t: 51 msec       LVOT VTI: 22.2 cm  MVA by PHT:4.31 cm2   MV E' Septal Velocity:  6.25 cm/s  MV E' Lateral Velocity:  13.2 cm/s  MV E/E' septal: 10.5  MV E/E' lateral: 4.97      XR CHEST PORTABLE    Result Date: 5/20/2022  EXAMINATION: ONE XRAY VIEW OF THE CHEST 5/20/2022 10:40 am COMPARISON: May 6, 2020 HISTORY: ORDERING SYSTEM PROVIDED HISTORY: Chest pain TECHNOLOGIST PROVIDED HISTORY: Reason for exam:->Chest pain Reason for Exam: Chest pain FINDINGS: The cardiomediastinal silhouette is not enlarged. No pleural effusion or pneumothorax. No focal consolidation. Calcified granuloma over the left lower chest.  Streak atelectasis in the left lung base. No acute cardiopulmonary abnormality. EKG (if obtained): (All EKG's are interpreted by myself in the absence of a cardiologist)  Normal sinus rhythm, ventricular rate 94, NM interval 140, QRS duration 457, mild ST elevation in high lateral leads with T wave inversion in inferior leads, discussed with cardiology who was in the ED and did suggest STEMI alert although borderline EKG    MDM:    57-year-old female presenting with chest pain. History was seen above. Vitals on presentation reassuring and patient afebrile satting on room air. Physical exam can be seen above. EKG shows some mild ST elevation in the high lateral leads with some T wave versions in inferior leads. Cardiology was in the ED and did discuss with them promptly on EKG and did suggest STEMI alert. STEMI alert was called. This x-ray shows no acute cardiopulmonary abnormalities. Laboratory evaluation is pending. Patient will be taken up to Cath Lab. CRP and sed rate are pending cardiology request is difficult at this time to say whether this is coronary related versus pericarditis. Hospital service was called and patient will be admitted to their service after Cath Lab. Clinical Impression:  1. Chest pain, unspecified type          Comment: Please note this report has been produced using speech recognition software and may contain errors related to that system including errors in grammar, punctuation, and spelling, as well as words and phrases that may be inappropriate. Efforts were made to edit the dictations.         Gopi Ferreira Gilda Foley MD  05/20/22 6795

## 2022-05-21 ENCOUNTER — APPOINTMENT (OUTPATIENT)
Dept: CT IMAGING | Age: 67
End: 2022-05-21
Payer: MEDICARE

## 2022-05-21 ENCOUNTER — APPOINTMENT (OUTPATIENT)
Dept: ULTRASOUND IMAGING | Age: 67
End: 2022-05-21
Payer: MEDICARE

## 2022-05-21 VITALS
BODY MASS INDEX: 25.52 KG/M2 | DIASTOLIC BLOOD PRESSURE: 72 MMHG | RESPIRATION RATE: 25 BRPM | HEART RATE: 85 BPM | TEMPERATURE: 97.8 F | WEIGHT: 138.7 LBS | OXYGEN SATURATION: 100 % | SYSTOLIC BLOOD PRESSURE: 123 MMHG | HEIGHT: 62 IN

## 2022-05-21 LAB
ALBUMIN SERPL-MCNC: 3.9 GM/DL (ref 3.4–5)
ALP BLD-CCNC: 63 IU/L (ref 40–128)
ALT SERPL-CCNC: 12 U/L (ref 10–40)
ANION GAP SERPL CALCULATED.3IONS-SCNC: 13 MMOL/L (ref 4–16)
AST SERPL-CCNC: 14 IU/L (ref 15–37)
BILIRUB SERPL-MCNC: 0.4 MG/DL (ref 0–1)
BUN BLDV-MCNC: 13 MG/DL (ref 6–23)
CALCIUM SERPL-MCNC: 9.7 MG/DL (ref 8.3–10.6)
CHLORIDE BLD-SCNC: 103 MMOL/L (ref 99–110)
CHOLESTEROL: 206 MG/DL
CO2: 22 MMOL/L (ref 21–32)
CREAT SERPL-MCNC: 0.8 MG/DL (ref 0.6–1.1)
EKG ATRIAL RATE: 75 BPM
EKG DIAGNOSIS: NORMAL
EKG P AXIS: 41 DEGREES
EKG P-R INTERVAL: 146 MS
EKG Q-T INTERVAL: 376 MS
EKG QRS DURATION: 92 MS
EKG QTC CALCULATION (BAZETT): 419 MS
EKG R AXIS: 57 DEGREES
EKG T AXIS: 39 DEGREES
EKG VENTRICULAR RATE: 75 BPM
ERYTHROCYTE SEDIMENTATION RATE: 33 MM/HR (ref 0–30)
ESTIMATED AVERAGE GLUCOSE: 120 MG/DL
GFR AFRICAN AMERICAN: >60 ML/MIN/1.73M2
GFR NON-AFRICAN AMERICAN: >60 ML/MIN/1.73M2
GLUCOSE BLD-MCNC: 92 MG/DL (ref 70–99)
HBA1C MFR BLD: 5.8 % (ref 4.2–6.3)
HCT VFR BLD CALC: 41.7 % (ref 37–47)
HDLC SERPL-MCNC: 71 MG/DL
HEMOGLOBIN: 13.3 GM/DL (ref 12.5–16)
HIGH SENSITIVE C-REACTIVE PROTEIN: 189.9 MG/L
LDL CHOLESTEROL CALCULATED: 120 MG/DL
MCH RBC QN AUTO: 28.8 PG (ref 27–31)
MCHC RBC AUTO-ENTMCNC: 31.9 % (ref 32–36)
MCV RBC AUTO: 90.3 FL (ref 78–100)
PDW BLD-RTO: 13.4 % (ref 11.7–14.9)
PLATELET # BLD: 200 K/CU MM (ref 140–440)
PMV BLD AUTO: 9.8 FL (ref 7.5–11.1)
POTASSIUM SERPL-SCNC: 4.3 MMOL/L (ref 3.5–5.1)
RBC # BLD: 4.62 M/CU MM (ref 4.2–5.4)
SODIUM BLD-SCNC: 138 MMOL/L (ref 135–145)
T4 FREE: 1.04 NG/DL (ref 0.9–1.8)
TOTAL PROTEIN: 6.5 GM/DL (ref 6.4–8.2)
TRIGL SERPL-MCNC: 75 MG/DL
WBC # BLD: 5.6 K/CU MM (ref 4–10.5)

## 2022-05-21 PROCEDURE — 93005 ELECTROCARDIOGRAM TRACING: CPT | Performed by: NURSE PRACTITIONER

## 2022-05-21 PROCEDURE — 93971 EXTREMITY STUDY: CPT

## 2022-05-21 PROCEDURE — 36415 COLL VENOUS BLD VENIPUNCTURE: CPT

## 2022-05-21 PROCEDURE — 86141 C-REACTIVE PROTEIN HS: CPT

## 2022-05-21 PROCEDURE — 85652 RBC SED RATE AUTOMATED: CPT

## 2022-05-21 PROCEDURE — 6370000000 HC RX 637 (ALT 250 FOR IP): Performed by: NURSE PRACTITIONER

## 2022-05-21 PROCEDURE — 80061 LIPID PANEL: CPT

## 2022-05-21 PROCEDURE — 94761 N-INVAS EAR/PLS OXIMETRY MLT: CPT

## 2022-05-21 PROCEDURE — 83036 HEMOGLOBIN GLYCOSYLATED A1C: CPT

## 2022-05-21 PROCEDURE — 84439 ASSAY OF FREE THYROXINE: CPT

## 2022-05-21 PROCEDURE — 6370000000 HC RX 637 (ALT 250 FOR IP): Performed by: INTERNAL MEDICINE

## 2022-05-21 PROCEDURE — G0378 HOSPITAL OBSERVATION PER HR: HCPCS

## 2022-05-21 PROCEDURE — 2580000003 HC RX 258: Performed by: NURSE PRACTITIONER

## 2022-05-21 PROCEDURE — 6360000004 HC RX CONTRAST MEDICATION: Performed by: HOSPITALIST

## 2022-05-21 PROCEDURE — 93010 ELECTROCARDIOGRAM REPORT: CPT | Performed by: INTERNAL MEDICINE

## 2022-05-21 PROCEDURE — 85027 COMPLETE CBC AUTOMATED: CPT

## 2022-05-21 PROCEDURE — APPSS45 APP SPLIT SHARED TIME 31-45 MINUTES: Performed by: NURSE PRACTITIONER

## 2022-05-21 PROCEDURE — 2580000003 HC RX 258: Performed by: INTERNAL MEDICINE

## 2022-05-21 PROCEDURE — 80053 COMPREHEN METABOLIC PANEL: CPT

## 2022-05-21 PROCEDURE — 99214 OFFICE O/P EST MOD 30 MIN: CPT | Performed by: INTERNAL MEDICINE

## 2022-05-21 PROCEDURE — 71275 CT ANGIOGRAPHY CHEST: CPT

## 2022-05-21 RX ORDER — COLCHICINE 0.6 MG/1
0.6 TABLET ORAL 2 TIMES DAILY
Qty: 28 TABLET | Refills: 0 | Status: SHIPPED | OUTPATIENT
Start: 2022-05-21 | End: 2022-06-27 | Stop reason: ALTCHOICE

## 2022-05-21 RX ADMIN — SODIUM CHLORIDE, PRESERVATIVE FREE 10 ML: 5 INJECTION INTRAVENOUS at 08:51

## 2022-05-21 RX ADMIN — SODIUM CHLORIDE, PRESERVATIVE FREE 10 ML: 5 INJECTION INTRAVENOUS at 08:59

## 2022-05-21 RX ADMIN — Medication 1 TABLET: at 08:48

## 2022-05-21 RX ADMIN — FAMOTIDINE 20 MG: 20 TABLET ORAL at 08:48

## 2022-05-21 RX ADMIN — IOPAMIDOL 75 ML: 755 INJECTION, SOLUTION INTRAVENOUS at 14:56

## 2022-05-21 RX ADMIN — COLCHICINE 0.6 MG: 0.6 TABLET ORAL at 08:48

## 2022-05-21 RX ADMIN — ASPIRIN 81 MG: 81 TABLET, COATED ORAL at 08:48

## 2022-05-21 ASSESSMENT — PAIN SCALES - GENERAL: PAINLEVEL_OUTOF10: 0

## 2022-05-21 NOTE — PLAN OF CARE
Problem: Discharge Planning  Goal: Discharge to home or other facility with appropriate resources  5/21/2022 1206 by Jena Castillo LPN  Outcome: Progressing  5/21/2022 0326 by Hilda Beltran RN  Outcome: Progressing  Flowsheets  Taken 5/21/2022 0310  Discharge to home or other facility with appropriate resources: Identify barriers to discharge with patient and caregiver  Taken 5/21/2022 0307  Discharge to home or other facility with appropriate resources: Identify barriers to discharge with patient and caregiver     Problem: ABCDS Injury Assessment  Goal: Absence of physical injury  5/21/2022 1206 by Jena Castillo LPN  Outcome: Progressing  5/21/2022 0326 by Hilda Beltran RN  Outcome: Progressing

## 2022-05-21 NOTE — PROGRESS NOTES
[facility]  [place]  Phone: [phone]    Fax [phone]                  Tarun Caballero MD, Angela Childress MD, [address] Cliff Torres MD, MD Bailey Gallegos MD Lurene Cart, MD Susann Custard, MD Jess Salles, APRN      2621 NKelsey Burris, APRN  Ilana Martínez, APRN    Jeanine Holt, APRN  Danny Campos PA-C     Cardiology Progress Note     Today's Plan: CT chest     Admit Date:  5/20/2022  CARDIOLOGY ATTENDING ADDENDUM    MEDICAL DECISION MAKING;  1. Cath showed no cad  2. Suspected pericarditis or pleurisy with ongoing cough and symptoms getting worse on deep inspiration  3. gte cta to rule out pe and lung pathology   4. Check ESR CRP  5. Continue colchicine for 2 weeks  6. DVT prophylaxis if no contraindication  6. Dyslipidemia: Lipid panel  If CTA negative discharge from cardiology point follow-up in office          HPI:  I have reviewed the HPI  And agree with above   Humberto Kin is a 69 y. o.year old who and presents with had concerns including Chest Pain. Chief Complaint   Patient presents with    Chest Pain     Please review addendum/changes made to note above   Interval history:  Cath showed no cad    Physical Exam:  General:  Awake, alert, NAD  Head:normal  Eye:normal  Neck:  No JVD   Chest:  Clear to auscultation, respiration easy  Cardiovascular:  S1 and S2 audible, No added heart sounds, No signs of ankle edema, or volume overload, No evidence of JVD, No crackles  Abdomen:   nontender  Extremities:  no edema  Pulses; palpable  Neuro: grossly normal        I agree with the plan, which was planned by myself and discussed with advanced level provider. My documented MDM is a substantive portion of the supervisory note. I have seen ,spoken to  and examined this patient personally, independently of the advanced level provider.   I have spent substantiate  portion of this encounter independently myself in examining patient and developing the medical management plan . I have reviewed the hospital care given to date and reviewed all pertinent labs and imaging. The plan was developed mutually at the time of the visit with the patient,  NP /PA  and myself. I have spoken with patient, nursing staff and provided written and verbal instructions . The above note has been reviewed and I agree with the assessment, diagnosis, and treatment plan with changes made by me as follows . Lora Borrero MD [facility] [place] 05/21/22   Consult reason/ Seen today for: STEMI/ Pericarditis    Subjective and  Overnight Events:  Patient is feeling better. She still has some pain on inspiration    Chest pain yes - improved  Shortness of breath no  Palpitations no  Dizziness no  Swelling no      Assessment and Plan:  Pericarditis vs pleurisy: CRP is high. Continue colchicine. Checking CT chest   [facility] no CAD. L leg swelling: she reports old tendon rupture and swelling in the lower leg since. She reports US few months ago was normal.    Will sign off. Please re consult if additional cardiology recommendations are needed. Telemetry Reviewed:   Sinus rhythm    ECHO :   Echocardiogram 5/20/2022  Summary   Left ventricular systolic function is normal.   Ejection fraction is visually estimated at 55%. No significant valvular disease noted. No evidence of any pericardial effusion. History of Presenting Illness:    Chief complain on admission : 69 y. o.year old who is admitted for  Chief Complaint   Patient presents with    Chest Pain        Past medical history:    has no past medical history on file. Past surgical history:   has a past surgical history that includes Breast enhancement surgery; Colonoscopy (2005); and Colonoscopy (02/22/2017). Social History:   reports that she has never smoked. She has never used smokeless tobacco. She reports current alcohol use of about 3.0 standard drinks of alcohol per week.  She reports that she does not use drugs. Family history:  family history is not on file. No Known Allergies    Review of Systems   All 14 systems were reviewed and are negative  Except for the positive findings  which as documented     /86   Pulse 74   Temp 98.1 °F (36.7 °C) (Oral)   Resp 23   Ht 5' 2\" (1.575 m)   Wt 138 lb 11.2 oz (62.9 kg)   SpO2 96%   BMI 25.37 kg/m²       Intake/Output Summary (Last 24 hours) at 5/21/2022 0816  Last data filed at 5/20/2022 2138  Gross per 24 hour   Intake 10 ml   Output    Net 10 ml       Physical Exam  Vitals reviewed. Constitutional:       General: She is not in acute distress. Appearance: Normal appearance. She is not ill-appearing. HENT:      Head: Atraumatic. Neck:      Vascular: No carotid bruit. Cardiovascular:      Rate and Rhythm: Normal rate and regular rhythm. Pulses: Normal pulses. Heart sounds: Normal heart sounds. No murmur heard. Pulmonary:      Effort: Pulmonary effort is normal. No respiratory distress. Breath sounds: Normal breath sounds. Musculoskeletal:         General: No swelling or deformity. Cervical back: Neck supple. No muscular tenderness. Skin:     Comments: left wrist site dressing is clean dry and intact, site is soft without hematoma, bruising or bleeding noted. Neurological:      Mental Status: She is alert.              Medications:    sodium chloride flush  5-40 mL IntraVENous 2 times per day    atorvastatin  40 mg Oral Nightly    famotidine  20 mg Oral Daily    enoxaparin  40 mg SubCUTAneous Q24H    aspirin  81 mg Oral Once per day on Sun Tue Thu Sat    calcium-cholecalciferol  1 tablet Oral Daily    colchicine  0.6 mg Oral BID    sodium chloride  500 mL IntraVENous Once    sodium chloride flush  5-40 mL IntraVENous 2 times per day      sodium chloride      sodium chloride       sodium chloride flush, sodium chloride, ondansetron **OR** ondansetron, acetaminophen **OR** acetaminophen, polyethylene glycol, sodium chloride flush, sodium chloride, hydrALAZINE, melatonin, morphine    Lab Data:  CBC:   Recent Labs     05/20/22  1045 05/21/22  0234   WBC 10.2 5.6   HGB 14.3 13.3   HCT 44.0 41.7   MCV 90.7 90.3    200     BMP:   Recent Labs     05/20/22  1045   *   K 3.8      CO2 23   BUN 10   CREATININE 0.9     PT/INR:   Recent Labs     05/20/22  1045   PROTIME 14.8*   INR 1.15     BNP:  No results for input(s): PROBNP in the last 72 hours. TROPONIN:   Recent Labs     05/20/22  1045   TROPONINT <0.010           All labs, medications and tests reviewed by myself , continue all other medications of all above medical condition listed as is except for changes mentioned above. Thank you very much for consult , please call with questions.     Electronically signed by KIERA Hidalgo CNP on 5/21/2022 at 8:57 AM

## 2022-05-21 NOTE — PLAN OF CARE
Problem: Discharge Planning  Goal: Discharge to home or other facility with appropriate resources  5/21/2022 0326 by Porter Duran RN  Outcome: Progressing  Flowsheets  Taken 5/21/2022 0310  Discharge to home or other facility with appropriate resources: Identify barriers to discharge with patient and caregiver  Taken 5/21/2022 0307  Discharge to home or other facility with appropriate resources: Identify barriers to discharge with patient and caregiver  5/20/2022 1609 by Kaveh Lovelace RN  Outcome: Progressing     Problem: ABCDS Injury Assessment  Goal: Absence of physical injury  Outcome: Progressing     Problem: Pain  Goal: Verbalizes/displays adequate comfort level or baseline comfort level  5/20/2022 1609 by Kaveh Lovelace RN  Outcome: Completed    Electronically signed by Porter Duran RN on 5/21/22 at 3:26 AM EDT

## 2022-05-21 NOTE — DISCHARGE SUMMARY
Discharge Summary    Name:  Melissa Pérez /Age/Sex: 1955  (69 y.o. female)   MRN & CSN:  [id number] & [id number] Admission Date/Time: 2022 10:36 AM   Attending:  Tyler Zheng MD Discharging Physician: Tyler Zheng MD     Hospital Course:   Melissa Pérez is a 69 y.o.  female  who presents with Chest pain     Chest pain and was concerning for STEMI status post left heart cath showing no occlusive disease, possible pericarditis versus pleurisy, continue on colchicine twice a day for 14 days    monitor ESR and C-reactive protein outpatient with PCP   Cardiologist on board, cleared the patient to be d/c home    Echocardiogram with ejection fraction 51% normal diastolic function  CTA chest show no PE , small pleural effusions with atelectasis         Left leg swelling with tenderness: Order venous Doppler  Ruled out DVT    The patient expressed appropriate understanding of and agreement with the discharge recommendations, medications, and plan.      Consults this admission:  IP CONSULT TO HOSPITALIST  IP CONSULT TO HOSPITALIST    Discharge Instruction:   Follow up appointments:   Primary care physician:  within 1  weeks    Diet:  regular diet   Activity: activity as tolerated  Disposition: Discharged to:   [x]Home, [][facility], []SNF, []Acute Rehab, []Hospice   Condition on discharge: Stable    Discharge Medications:        Medication List      START taking these medications    colchicine 0.6 MG tablet  Commonly known as: COLCRYS  Take 1 tablet by mouth 2 times daily for 14 days        CONTINUE taking these medications    aspirin 81 MG tablet     CALCIUM 600 + D PO     Co Q-10 100 MG Caps     Curcumin Powd     Garlic 240 MG Tabs     JUICE PLUS FIBRE PO     OMEGA 3 500 PO     OSTEO BI-FLEX ADV TRIPLE ST PO     PROBIOTIC & ACIDOPHILUS EX ST PO     therapeutic multivitamin-minerals tablet     Vitamin D3 50 MCG ( UT) Caps           Where to Get Your Medications      You can get these medications from any pharmacy    Bring a paper prescription for each of these medications  · colchicine 0.6 MG tablet         Objective Findings at Discharge:   /86   Pulse 74   Temp 98.1 °F (36.7 °C) (Oral)   Resp 23   Ht 5' 2\" (1.575 m)   Wt 138 lb 11.2 oz (62.9 kg)   SpO2 96%   BMI 25.37 kg/m²            PHYSICAL EXAM   GEN    Awake.  Alert , not in respiratory distress, not in pain  HEENT: PEERLA, , supple neck,   Chest: air entry equal bilaterally, no wheezing or crepitation  Heart: S1 and S2 heard, no murmur, no gallop or rub, regular rate  Abdomen: soft, ND , Nt, +BS  Extremities: no cyanosis, tenderness or erythema, peripheral pulses audible  There is swelling and tenderness of the left leg in comparison to the right leg  Neurology: alert, oriented x3, able to move 4 limbs  BMP/CBC  Recent Labs     05/20/22  1045 05/21/22  0234   *  --    K 3.8  --      --    CO2 23  --    BUN 10  --    CREATININE 0.9  --    WBC 10.2 5.6   HCT 44.0 41.7    200       IMAGING:      Discharge Time of 35  minutes    Electronically signed by Ander Cote MD on 5/21/2022 at 10:07 AM

## 2022-05-21 NOTE — PROGRESS NOTES
Hospitalist Progress Note      Name:  Sheryl Alvarez /Age/Sex: 1955  (69 y.o. female)   MRN & CSN:  [id number] & [id number] Admission Date/Time: 2022 10:36 AM   Location:  [address] [place] [address] PCP: Alicia Fu MD         Hospital Day: 2    Assessment and Plan:       Chest pain and was concerning for STEMI status post left heart cath showing no occlusive disease, possible pericarditis versus pleurisy, continue on colchicine  Continue to monitor ESR and C-reactive protein  Continue pain medication as needed  Cardiologist on board   Echocardiogram with ejection fraction 04% normal diastolic function  Patient on aspirin and statin  CTA chest also ordered by cardiologist       Left leg swelling with tenderness: Order venous Doppler to rule out DVT    Diet ADULT DIET; Regular   DVT Prophylaxis [x] Lovenox, []  Heparin, [] SCDs, [] Ambulation   GI Prophylaxis [] PPI,  [x] H2 Blocker,  [] Carafate,  [] Diet/Tube Feeds   Code Status Full Code   Disposition Patient requires continued admission due to    MDM [] Low, [] Moderate,[]  High  Patient's risk as above due to      History of Present Illness: The patient was seen and examined at the bedside  Patient denies chest pain or shortness of breath  Patient complaining of left leg swelling and tenderness on palpation  Order venous Doppler to rule out DVT    Objective: Intake/Output Summary (Last 24 hours) at 2022 0803  Last data filed at 2022 2138  Gross per 24 hour   Intake 10 ml   Output    Net 10 ml      Vitals:   Vitals:    22 0605   BP: 105/74   Pulse:    Resp:    Temp: 98.1 °F (36.7 °C)   SpO2:      Physical Exam:   GEN Awake.  Alert , not in respiratory distress, not in pain  HEENT: PEERLA, , supple neck,   Chest: air entry equal bilaterally, no wheezing or crepitation  Heart: S1 and S2 heard, no murmur, no gallop or rub, regular rate  Abdomen: soft, ND , Nt, +BS  Extremities: no cyanosis, tenderness or erythema, peripheral pulses audible  There is swelling and tenderness of the left leg in comparison to the right leg  Neurology: alert, oriented x3, able to move 4 limbs    Medications:   Medications:    sodium chloride flush  5-40 mL IntraVENous 2 times per day    atorvastatin  40 mg Oral Nightly    famotidine  20 mg Oral Daily    enoxaparin  40 mg SubCUTAneous Q24H    aspirin  81 mg Oral Once per day on Sun Tue Thu Sat    calcium-cholecalciferol  1 tablet Oral Daily    colchicine  0.6 mg Oral BID    sodium chloride  500 mL IntraVENous Once    sodium chloride flush  5-40 mL IntraVENous 2 times per day      Infusions:    sodium chloride      sodium chloride       PRN Meds: sodium chloride flush, 5-40 mL, PRN  sodium chloride, , PRN  ondansetron, 4 mg, Q8H PRN   Or  ondansetron, 4 mg, Q6H PRN  acetaminophen, 650 mg, Q6H PRN   Or  acetaminophen, 650 mg, Q6H PRN  polyethylene glycol, 17 g, Daily PRN  sodium chloride flush, 5-40 mL, PRN  sodium chloride, , PRN  hydrALAZINE, 10 mg, Q10 Min PRN  melatonin, 3 mg, Nightly PRN  morphine, 2 mg, Q4H PRN          Electronically signed by Ivan Walter MD on 5/21/2022 at 8:03 AM

## 2022-05-22 LAB
EKG ATRIAL RATE: 92 BPM
EKG DIAGNOSIS: NORMAL
EKG P AXIS: 37 DEGREES
EKG P-R INTERVAL: 148 MS
EKG Q-T INTERVAL: 348 MS
EKG QRS DURATION: 84 MS
EKG QTC CALCULATION (BAZETT): 430 MS
EKG R AXIS: 22 DEGREES
EKG T AXIS: 18 DEGREES
EKG VENTRICULAR RATE: 92 BPM

## 2022-05-22 PROCEDURE — 93010 ELECTROCARDIOGRAM REPORT: CPT | Performed by: INTERNAL MEDICINE

## 2022-06-27 ENCOUNTER — OFFICE VISIT (OUTPATIENT)
Dept: CARDIOLOGY CLINIC | Age: 67
End: 2022-06-27
Payer: MEDICARE

## 2022-06-27 VITALS
HEIGHT: 62 IN | WEIGHT: 140 LBS | HEART RATE: 72 BPM | DIASTOLIC BLOOD PRESSURE: 74 MMHG | BODY MASS INDEX: 25.76 KG/M2 | SYSTOLIC BLOOD PRESSURE: 118 MMHG

## 2022-06-27 DIAGNOSIS — Z09 HOSPITAL DISCHARGE FOLLOW-UP: ICD-10-CM

## 2022-06-27 DIAGNOSIS — E78.5 DYSLIPIDEMIA: Primary | ICD-10-CM

## 2022-06-27 DIAGNOSIS — I30.0 IDIOPATHIC PERICARDITIS, UNSPECIFIED CHRONICITY: ICD-10-CM

## 2022-06-27 PROCEDURE — 1111F DSCHRG MED/CURRENT MED MERGE: CPT | Performed by: NURSE PRACTITIONER

## 2022-06-27 PROCEDURE — 99214 OFFICE O/P EST MOD 30 MIN: CPT | Performed by: NURSE PRACTITIONER

## 2022-06-27 PROCEDURE — 1123F ACP DISCUSS/DSCN MKR DOCD: CPT | Performed by: NURSE PRACTITIONER

## 2022-06-27 ASSESSMENT — ENCOUNTER SYMPTOMS
SHORTNESS OF BREATH: 0
COUGH: 0

## 2022-06-27 NOTE — PROGRESS NOTES
[facility] [address]  Phone: [phone]    Fax [phone]    Julio Ho MD, Lg Kim MD, [address], MD, MD Ирина Marques MD Duffy Ivanoff, MD Samual Pretzel, MD Gutiérrez Pulse, APRN      Gareth Vazquez, APRN  Vivek Mora, APRN     Clelia Soulier, APRN  Maria Elena Karimi PA-C     CARDIOLOGY  NOTE    2022    Minh Xiong (:  1955) is a 69 y.o. female,an established patient with Dr. Johnny Duque, here for evaluation of the following chief complaint(s):  Follow-Up from Hospital (pt was in hospital for CP, dx was pericarditis. pt denies any CP, SOB, palpitaitons or dizziness. never smoker does not drink. no future procedures )        SUBJECTIVE/OBJECTIVE:    Minh Xiong ia a 69 y.o. female who prsented to the ED for CHest apin. LHC was normal and she was sent home on coolchocine and her pain is completelty resolved sice day 2. She denises any other cardiac events. Review of Systems   Constitutional: Negative for fatigue and fever. Respiratory: Negative for cough and shortness of breath. Cardiovascular: Negative for chest pain, palpitations and leg swelling. Musculoskeletal: Negative for arthralgias and gait problem. Neurological: Negative for dizziness, syncope, weakness, light-headedness and headaches. Vitals:    22 1300   BP: 118/74   Pulse: 72   Weight: 140 lb (63.5 kg)   Height: 5' 2\" (1.575 m)       Wt Readings from Last 3 Encounters:   22 140 lb (63.5 kg)   22 138 lb 11.2 oz (62.9 kg)   19 130 lb (59 kg)       BP Readings from Last 3 Encounters:   22 118/74   22 123/72   19 138/88       Prior to Admission medications    Medication Sig Start Date End Date Taking?  Authorizing Provider   Calcium Carb-Cholecalciferol (CALCIUM 600 + D PO) Take by mouth   Yes Historical Provider, MD   Multiple Vitamins-Minerals (THERAPEUTIC MULTIVITAMIN-MINERALS) tablet Take 1 tablet by mouth daily   Yes Historical Provider, MD   Cholecalciferol (VITAMIN D3) 2000 UNITS CAPS Take by mouth daily   Yes Historical Provider, MD   Misc Natural Products (OSTEO BI-FLEX ADV TRIPLE ST PO) Take by mouth daily   Yes Historical Provider, Hope Flynn, (Ruben Montes) POWD by Does not apply route   Yes Historical Provider, MD   Omega-3 Fatty Acids (OMEGA 3 500 PO) Take by mouth   Yes Historical Provider, MD   Garlic 545 MG TABS Take by mouth   Yes Historical Provider, MD   Coenzyme Q10 (CO Q-10) 100 MG CAPS Take by mouth daily   Yes Historical Provider, MD   Nutritional Supplements (JUICE PLUS FIBRE PO) Take by mouth daily   Yes Historical Provider, MD   Probiotic Product (PROBIOTIC & ACIDOPHILUS EX ST PO) Take by mouth   Yes Historical Provider, MD   aspirin 81 MG tablet Take 81 mg by mouth four times a week   Yes Historical Provider, MD   colchicine (COLCRYS) 0.6 MG tablet Take 1 tablet by mouth 2 times daily for 14 days 5/21/22 6/27/22  Sofia Gaitan MD       Physical Exam  Vitals reviewed. Constitutional:       General: She is not in acute distress. Appearance: Normal appearance. She is not ill-appearing. HENT:      Head: Atraumatic. Neck:      Vascular: No carotid bruit. Cardiovascular:      Rate and Rhythm: Normal rate and regular rhythm. Pulses: Normal pulses. Heart sounds: Normal heart sounds. No murmur heard. Pulmonary:      Effort: Pulmonary effort is normal. No respiratory distress. Breath sounds: Normal breath sounds. Musculoskeletal:         General: No swelling or deformity. Cervical back: Neck supple. No muscular tenderness. Neurological:      Mental Status: She is alert.          Health Maintenance   Topic Date Due    Annual Wellness Visit (AWV)  Never done    COVID-19 Vaccine (1) Never done    Depression Screen  Never done    Hepatitis C screen  Never done    DTaP/Tdap/Td vaccine (1 - Tdap) Never done    Colorectal Cancer Screen  Never done    Breast cancer screen  Never done    DEXA (modify frequency per FRAX score)  Never done    Pneumococcal 65+ years Vaccine (1 - PCV) Never done    Shingles vaccine (2 of 2) 06/23/2022    A1C test (Diabetic or Prediabetic)  05/21/2023    Lipids  05/21/2027    Flu vaccine  Completed    Hepatitis A vaccine  Aged Out    Hepatitis B vaccine  Aged Out    Hib vaccine  Aged Out    Meningococcal (ACWY) vaccine  Aged Out       Lab Review   Lab Results   Component Value Date    CHOL 206 05/21/2022    TRIG 75 05/21/2022    HDL 71 05/21/2022      Echo 5/2022  Summary   Left ventricular systolic function is normal.   Ejection fraction is visually estimated at 55%. No significant valvular disease noted. No evidence of any pericardial effusion. [facility] 5/2022   Procedure Summary   Access : radial indication : chest pain   1. LAD is widely patent, Circ and RCA are widely patent   2. LVEDP was 7 mmHG    ASSESSMENT/PLAN:    Pericarditis  Resolved. Will monitor     Dyslipidemia  continue monitoring cholesterol  Discussed healthy lifestyle, dietary changes and good fats. Return in about 1 year (around 6/27/2023). An electronic signature was used to authenticate this note.     Electronically signed by KIERA Bolanos CNP on 6/27/2022 at 1:08 PM

## 2022-10-27 ENCOUNTER — TELEPHONE (OUTPATIENT)
Dept: CARDIOLOGY CLINIC | Age: 67
End: 2022-10-27

## 2022-10-27 NOTE — TELEPHONE ENCOUNTER
Patient advised date of pericarditis was 5/20/2022.  She will check with health dept as to timing of booster

## 2022-10-27 NOTE — TELEPHONE ENCOUNTER
Patient has an appointment for covid booster today at 2:45. Questionaire ask if she has had pericarditis in last 6 months and she thinks she had it in May. Wants to confirm that date and also see if she should wait to get the booster. Please call patient before 2:00 today if possible.

## 2023-06-14 LAB
BUN BLDV-MCNC: 14 MG/DL
CALCIUM SERPL-MCNC: 10.5 MG/DL
CHLORIDE BLD-SCNC: 105 MMOL/L
CO2: 26 MMOL/L
CREAT SERPL-MCNC: 0.8 MG/DL
EGFR: NORMAL
GLUCOSE BLD-MCNC: 100 MG/DL
POTASSIUM SERPL-SCNC: 4.2 MMOL/L
SODIUM BLD-SCNC: 142 MMOL/L

## 2023-06-20 ENCOUNTER — OFFICE VISIT (OUTPATIENT)
Dept: CARDIOLOGY CLINIC | Age: 68
End: 2023-06-20
Payer: MEDICARE

## 2023-06-20 VITALS
DIASTOLIC BLOOD PRESSURE: 70 MMHG | HEART RATE: 74 BPM | HEIGHT: 62 IN | SYSTOLIC BLOOD PRESSURE: 122 MMHG | WEIGHT: 133 LBS | BODY MASS INDEX: 24.48 KG/M2 | OXYGEN SATURATION: 96 %

## 2023-06-20 DIAGNOSIS — I67.858 OTHER HEREDITARY CEREBROVASCULAR DISEASE: ICD-10-CM

## 2023-06-20 DIAGNOSIS — Z82.3 FAMILY HISTORY OF STROKE OR TRANSIENT ISCHEMIC ATTACK IN MOTHER: ICD-10-CM

## 2023-06-20 DIAGNOSIS — Z82.3: ICD-10-CM

## 2023-06-20 DIAGNOSIS — E78.5 DYSLIPIDEMIA: ICD-10-CM

## 2023-06-20 DIAGNOSIS — Z82.49 FAMILY HISTORY OF CAROTID ARTERY STENOSIS: Primary | ICD-10-CM

## 2023-06-20 DIAGNOSIS — I30.0 IDIOPATHIC PERICARDITIS, UNSPECIFIED CHRONICITY: ICD-10-CM

## 2023-06-20 PROCEDURE — 1123F ACP DISCUSS/DSCN MKR DOCD: CPT | Performed by: NURSE PRACTITIONER

## 2023-06-20 PROCEDURE — 99214 OFFICE O/P EST MOD 30 MIN: CPT | Performed by: NURSE PRACTITIONER

## 2023-06-20 RX ORDER — ESTRADIOL 10 UG/1
10 INSERT VAGINAL DAILY
COMMUNITY

## 2023-06-20 ASSESSMENT — ENCOUNTER SYMPTOMS
COUGH: 0
SHORTNESS OF BREATH: 0

## 2023-06-20 NOTE — PATIENT INSTRUCTIONS
Please be informed that if you contact our office outside of normal business hours the physician on call cannot help with any scheduling or rescheduling issues, procedure instruction questions or any type of medication issue. We advise you for any urgent/emergency that you go to the nearest emergency room! PLEASE CALL OUR OFFICE DURING NORMAL BUSINESS HOURS    Monday - Friday   8 am to 5 pm    [facility] [address]: [phone]    [place]:  [address] Laboratory Locations - No appointment necessary. Sites open Monday to Friday. Call your preferred location for test preparation, business   hours and other information you need. [facility] [employer]. [address] [place], [address] [facility]  Phone: [phone]     **It is YOUR responsibilty to bring medication bottles and/or updated medication list to [address] [place] [address]. This will allow us to better serve you and all your healthcare needs**  Thank you for allowing us to care for you today! We want to ensure we can follow your treatment plan and we strive to give you the best outcomes and experience possible. If you ever have a life threatening emergency and call 911 - for an ambulance (EMS)   Our providers can only care for you at:   [facility] or [facility]. Even if you have someone take you or you drive yourself we can only care for you in a [facility] facility. Our providers are not setup at the other healthcare locations!

## 2023-06-20 NOTE — PROGRESS NOTES
CARDIOLOGY  NOTE    2023    Nunu Sigala (:  1955) is a 77 y.o. female,an established patient with Dr. Gianna Brito, here for evaluation of the following chief complaint(s):  Follow-up (Pt states no cardiac sx )        SUBJECTIVE/OBJECTIVE:    Nunu Sigala has a past medical history as listed below. She is here to follow up on he cardiovascular health. She states that she has been feeling well. Concerned about her cholesterol and family hx of CVA  2022 [facility] was normal and she was sent home on coolchocine and her pain is completelty resolved sice day 2. She denises any other cardiac events. Review of Systems   Constitutional:  Negative for fatigue and fever. Respiratory:  Negative for cough and shortness of breath. Cardiovascular:  Negative for chest pain, palpitations and leg swelling. Musculoskeletal:  Negative for arthralgias and gait problem. Neurological:  Negative for dizziness, syncope, weakness, light-headedness and headaches. Vitals:    23 1428   BP: 122/70   Site: Left Upper Arm   Position: Sitting   Cuff Size: Medium Adult   Pulse: 74   SpO2: 96%   Weight: 133 lb (60.3 kg)   Height: 5' 2\" (1.575 m)       Wt Readings from Last 3 Encounters:   23 133 lb (60.3 kg)   22 140 lb (63.5 kg)   22 138 lb 11.2 oz (62.9 kg)       BP Readings from Last 3 Encounters:   23 122/70   22 118/74   22 123/72       Prior to Admission medications    Medication Sig Start Date End Date Taking?  Authorizing Provider   Estradiol (VAGIFEM) 10 MCG TABS vaginal tablet Place 1 tablet vaginally daily   Yes Historical Provider, MD   Calcium Carb-Cholecalciferol (CALCIUM 600 + D PO) Take by mouth   Yes Historical Provider, MD   Multiple Vitamins-Minerals (THERAPEUTIC MULTIVITAMIN-MINERALS) tablet Take 1 tablet by mouth daily   Yes Historical Provider, MD   Cholecalciferol (VITAMIN D3) 2000 UNITS CAPS Take by mouth daily   Yes Historical Provider, MD   Misc Natural

## 2023-07-25 ENCOUNTER — PROCEDURE VISIT (OUTPATIENT)
Dept: CARDIOLOGY CLINIC | Age: 68
End: 2023-07-25
Payer: MEDICARE

## 2023-07-25 DIAGNOSIS — E78.5 DYSLIPIDEMIA: ICD-10-CM

## 2023-07-25 DIAGNOSIS — Z82.49 FAMILY HISTORY OF CAROTID ARTERY STENOSIS: ICD-10-CM

## 2023-07-25 DIAGNOSIS — I30.0 IDIOPATHIC PERICARDITIS, UNSPECIFIED CHRONICITY: ICD-10-CM

## 2023-07-25 DIAGNOSIS — I67.858 OTHER HEREDITARY CEREBROVASCULAR DISEASE: ICD-10-CM

## 2023-07-25 DIAGNOSIS — Z82.3 FAMILY HISTORY OF STROKE OR TRANSIENT ISCHEMIC ATTACK IN MOTHER: ICD-10-CM

## 2023-07-25 DIAGNOSIS — Z82.3: ICD-10-CM

## 2023-07-25 PROCEDURE — 93880 EXTRACRANIAL BILAT STUDY: CPT | Performed by: INTERNAL MEDICINE

## 2023-07-25 NOTE — PROGRESS NOTES
Bilateral carotid arteries completed. Diagnosis code listed in notes and on the order are questionable as to the insurance paying for the exam. NP Nazia Zamora was asked if there is any other Diagnosis code that could be used, she said no. Patient was informed and they voiced understanding that insurance may not pay for the exam. They were informed that the out of pocket pay (with no insurance assistance) is about $397, could be more or less.  Patient voiced understanding and wanted to continue with the exam.

## 2023-07-31 ENCOUNTER — TELEPHONE (OUTPATIENT)
Dept: CARDIOLOGY CLINIC | Age: 68
End: 2023-07-31

## 2023-08-02 ENCOUNTER — TELEPHONE (OUTPATIENT)
Dept: CARDIOLOGY CLINIC | Age: 68
End: 2023-08-02

## 2023-08-02 NOTE — TELEPHONE ENCOUNTER
----- Message from KIERA Bennett CNP sent at 7/26/2023  8:38 AM EDT -----  Normal carotid US  ----- Message -----  From: [facility] Incoming Cardiovascular Results From [facility]  Sent: 7/25/2023   5:54 PM EDT  To: KIERA Bennett CNP    Spoke to patient regarding results of carotid. Patient voiced understanding.

## 2024-05-01 ENCOUNTER — HOSPITAL ENCOUNTER (OUTPATIENT)
Age: 69
Discharge: HOME | End: 2024-05-01
Payer: MEDICARE

## 2024-05-01 ENCOUNTER — HOSPITAL ENCOUNTER (OUTPATIENT)
Dept: GENERAL RADIOLOGY | Age: 69
Discharge: HOME | End: 2024-05-01
Payer: MEDICARE

## 2024-05-01 DIAGNOSIS — M25.511 RIGHT SHOULDER PAIN, UNSPECIFIED CHRONICITY: ICD-10-CM

## 2024-05-01 PROCEDURE — 73030 X-RAY EXAM OF SHOULDER: CPT

## 2024-07-08 ENCOUNTER — OFFICE VISIT (OUTPATIENT)
Dept: CARDIOLOGY CLINIC | Age: 69
End: 2024-07-08
Payer: MEDICARE

## 2024-07-08 VITALS
WEIGHT: 139 LBS | DIASTOLIC BLOOD PRESSURE: 82 MMHG | OXYGEN SATURATION: 94 % | BODY MASS INDEX: 25.58 KG/M2 | HEART RATE: 78 BPM | SYSTOLIC BLOOD PRESSURE: 120 MMHG | HEIGHT: 62 IN

## 2024-07-08 DIAGNOSIS — I30.0 IDIOPATHIC PERICARDITIS, UNSPECIFIED CHRONICITY: ICD-10-CM

## 2024-07-08 DIAGNOSIS — Z82.49 FAMILY HISTORY OF CAROTID ARTERY STENOSIS: Primary | ICD-10-CM

## 2024-07-08 DIAGNOSIS — E78.5 DYSLIPIDEMIA: ICD-10-CM

## 2024-07-08 DIAGNOSIS — R07.89 OTHER CHEST PAIN: ICD-10-CM

## 2024-07-08 PROCEDURE — 93000 ELECTROCARDIOGRAM COMPLETE: CPT | Performed by: INTERNAL MEDICINE

## 2024-07-08 PROCEDURE — 3017F COLORECTAL CA SCREEN DOC REV: CPT | Performed by: INTERNAL MEDICINE

## 2024-07-08 PROCEDURE — 99213 OFFICE O/P EST LOW 20 MIN: CPT | Performed by: INTERNAL MEDICINE

## 2024-07-08 PROCEDURE — G8419 CALC BMI OUT NRM PARAM NOF/U: HCPCS | Performed by: INTERNAL MEDICINE

## 2024-07-08 PROCEDURE — 1123F ACP DISCUSS/DSCN MKR DOCD: CPT | Performed by: INTERNAL MEDICINE

## 2024-07-08 PROCEDURE — G8400 PT W/DXA NO RESULTS DOC: HCPCS | Performed by: INTERNAL MEDICINE

## 2024-07-08 PROCEDURE — G8427 DOCREV CUR MEDS BY ELIG CLIN: HCPCS | Performed by: INTERNAL MEDICINE

## 2024-07-08 PROCEDURE — 1036F TOBACCO NON-USER: CPT | Performed by: INTERNAL MEDICINE

## 2024-07-08 PROCEDURE — 1090F PRES/ABSN URINE INCON ASSESS: CPT | Performed by: INTERNAL MEDICINE

## 2024-07-08 NOTE — ASSESSMENT & PLAN NOTE
Cardiac cath in 2022 did not reveal any obstructive coronary artery disease Images reviewed she had pericarditis she is improved since then currently not on any cardiac meds

## 2024-07-08 NOTE — PROGRESS NOTES
CARDIOLOGY  NOTE    Chief Complaint: History of pericarditis    HPI:   Susan is a 69 y.o. year old who has Past medical history as noted below.  She comes in for follow-up she had a cardiac cath due to ongoing chest pain back in May 2022 at that time she did not have any obstructive coronary artery disease echo was also normal she was treated for possible pericarditis since then she is doing well and has had no issues however she recently started having right shoulder pain during which she had an x-ray which was read as possible enlarged heart hence she is here for further discussion      Current Outpatient Medications   Medication Sig Dispense Refill    Estradiol (VAGIFEM) 10 MCG TABS vaginal tablet Place 1 tablet vaginally daily      Calcium Carb-Cholecalciferol (CALCIUM 600 + D PO) Take by mouth      Multiple Vitamins-Minerals (THERAPEUTIC MULTIVITAMIN-MINERALS) tablet Take 1 tablet by mouth daily      Cholecalciferol (VITAMIN D3) 2000 UNITS CAPS Take by mouth daily      Misc Natural Products (OSTEO BI-FLEX ADV TRIPLE ST PO) Take by mouth daily      Turmeric, Curcuma Longa, (CURCUMIN) POWD by Does not apply route      Omega-3 Fatty Acids (OMEGA 3 500 PO) Take by mouth      Garlic 400 MG TABS Take by mouth      Coenzyme Q10 (CO Q-10) 100 MG CAPS Take by mouth daily      Nutritional Supplements (JUICE PLUS FIBRE PO) Take by mouth daily      Probiotic Product (PROBIOTIC & ACIDOPHILUS EX ST PO) Take by mouth       No current facility-administered medications for this visit.       Allergies:   Patient has no known allergies.    Patient History:  Past Medical History:   Diagnosis Date    H/O colonoscopy 02/01/2017    normal per patient- Dr. Huff    H/O mammogram 11/01/2021    normal per patient-[facility]    Pap smear for cervical cancer screening 01/01/2021    normal per patient- Dr. [place]    Screening for colorectal cancer     colagaurd 4/2021- negative per patient     Past

## 2025-06-25 PROBLEM — C44.722 SCC (SQUAMOUS CELL CARCINOMA), LEG, RIGHT: Status: ACTIVE | Noted: 2025-06-25

## 2025-07-10 ENCOUNTER — OFFICE VISIT (OUTPATIENT)
Dept: CARDIOLOGY CLINIC | Age: 70
End: 2025-07-10
Payer: MEDICARE

## 2025-07-10 VITALS
HEIGHT: 61 IN | WEIGHT: 139 LBS | HEART RATE: 68 BPM | BODY MASS INDEX: 26.24 KG/M2 | DIASTOLIC BLOOD PRESSURE: 74 MMHG | SYSTOLIC BLOOD PRESSURE: 128 MMHG

## 2025-07-10 DIAGNOSIS — Z82.49 FAMILY HISTORY OF CAROTID ARTERY STENOSIS: Primary | ICD-10-CM

## 2025-07-10 DIAGNOSIS — I30.0 IDIOPATHIC PERICARDITIS, UNSPECIFIED CHRONICITY: ICD-10-CM

## 2025-07-10 DIAGNOSIS — E78.5 DYSLIPIDEMIA: Primary | ICD-10-CM

## 2025-07-10 PROCEDURE — 1090F PRES/ABSN URINE INCON ASSESS: CPT | Performed by: NURSE PRACTITIONER

## 2025-07-10 PROCEDURE — 3017F COLORECTAL CA SCREEN DOC REV: CPT | Performed by: NURSE PRACTITIONER

## 2025-07-10 PROCEDURE — G8400 PT W/DXA NO RESULTS DOC: HCPCS | Performed by: NURSE PRACTITIONER

## 2025-07-10 PROCEDURE — G8427 DOCREV CUR MEDS BY ELIG CLIN: HCPCS | Performed by: NURSE PRACTITIONER

## 2025-07-10 PROCEDURE — 1036F TOBACCO NON-USER: CPT | Performed by: NURSE PRACTITIONER

## 2025-07-10 PROCEDURE — 99214 OFFICE O/P EST MOD 30 MIN: CPT | Performed by: NURSE PRACTITIONER

## 2025-07-10 PROCEDURE — 1159F MED LIST DOCD IN RCRD: CPT | Performed by: NURSE PRACTITIONER

## 2025-07-10 PROCEDURE — 1123F ACP DISCUSS/DSCN MKR DOCD: CPT | Performed by: NURSE PRACTITIONER

## 2025-07-10 PROCEDURE — 93000 ELECTROCARDIOGRAM COMPLETE: CPT | Performed by: NURSE PRACTITIONER

## 2025-07-10 PROCEDURE — G8419 CALC BMI OUT NRM PARAM NOF/U: HCPCS | Performed by: NURSE PRACTITIONER

## 2025-07-10 ASSESSMENT — ENCOUNTER SYMPTOMS
COUGH: 0
SHORTNESS OF BREATH: 0

## 2025-07-10 NOTE — PATIENT INSTRUCTIONS
Thank you for allowing us to care for you today!   We want to ensure we can follow your treatment plan and we strive to give you the best outcomes and experience possible.   If you ever have a life threatening emergency and call 911 - for an ambulance (EMS)  REMEMBER  Our providers can only care for you at:   [facility] or [facility]   Even if you have someone take you or you drive yourself we can only care for you in a [facility] facility. Our providers are not setup at the other healthcare locations!    PLEASE CALL OUR OFFICE DURING NORMAL BUSINESS HOURS  Monday through Friday 8 am to 5 pm  AFTER HOURS the physician on-call cannot help with scheduling, rescheduling, procedure instruction questions or any type of medication need or issue.  [facility] P:[phone] - [facility] P:[phone] - [facility] P:[phone]      If you receive a survey:  We would appreciate you taking the time to share your experience concerning your provider visit in the office.    These surveys are confidential!  We are eager to improve and are counting on you to share your feedback so we can ensure you get the best care possible.

## 2025-07-10 NOTE — PROGRESS NOTES
CLINICAL STAFF DOCUMENTATION    Raisa Dias, LUIS ANTONIO     Susan Santana  1955  [id number]    Have you had any Chest Pain recently? - No          Have you had any Shortness of Breath - No      Have you had any dizziness - No      Have you had any palpitations recently? - No    Any thyroid issues? - No    Do you have any edema - swelling in No        When did you have your last labs drawn 2 yrs ago    Do we have the labs in their chart Yes      Do you need any prescriptions refilled? - No        Do use tobacco products? - No  Do you drink alcohol? - No  Do you use any illicit drugs? - No  Caffeine? - No        Check medication list thoroughly!!! AND RECONCILE OUTSIDE MEDICATIONS  If dose has changed change the entire order not just the MG  BE SURE TO ASK PATIENT IF THEY NEED MEDICATION REFILLS  Verify Pharmacy and update if incorrect    Add to every patient's \"wrap up\" the following dot phrase AFTERVISITCARDIOHEARTHOUSE and ensure we explain this to our patients   
attendance at the appointment consented to the use of AI, including the recording.        An electronic signature was used to authenticate this note.    Electronically signed by KIERA Rojas CNP on 7/10/2025 at 1:34 PM

## 2025-07-30 PROBLEM — Z09 S/P EXCISION OF SKIN LESION, FOLLOW-UP EXAM: Status: ACTIVE | Noted: 2025-07-30

## 2025-08-19 RX ORDER — ASPIRIN 81 MG/1
81 TABLET ORAL DAILY
COMMUNITY